# Patient Record
Sex: MALE | Race: WHITE | Employment: UNEMPLOYED | URBAN - NONMETROPOLITAN AREA
[De-identification: names, ages, dates, MRNs, and addresses within clinical notes are randomized per-mention and may not be internally consistent; named-entity substitution may affect disease eponyms.]

---

## 2019-07-25 ENCOUNTER — APPOINTMENT (OUTPATIENT)
Dept: GENERAL RADIOLOGY | Age: 39
End: 2019-07-25
Payer: COMMERCIAL

## 2019-07-25 ENCOUNTER — HOSPITAL ENCOUNTER (EMERGENCY)
Age: 39
Discharge: HOME | End: 2019-07-25
Payer: COMMERCIAL

## 2019-07-25 VITALS
RESPIRATION RATE: 22 BRPM | DIASTOLIC BLOOD PRESSURE: 88 MMHG | BODY MASS INDEX: 23.03 KG/M2 | SYSTOLIC BLOOD PRESSURE: 135 MMHG | OXYGEN SATURATION: 96 % | WEIGHT: 170 LBS | HEART RATE: 95 BPM | TEMPERATURE: 97.9 F | HEIGHT: 72 IN

## 2019-07-25 DIAGNOSIS — S46.911A STRAIN OF RIGHT SHOULDER, INITIAL ENCOUNTER: Primary | ICD-10-CM

## 2019-07-25 PROCEDURE — 73030 X-RAY EXAM OF SHOULDER: CPT

## 2019-07-25 PROCEDURE — 6360000002 HC RX W HCPCS: Performed by: NURSE PRACTITIONER

## 2019-07-25 PROCEDURE — 99283 EMERGENCY DEPT VISIT LOW MDM: CPT | Performed by: NURSE PRACTITIONER

## 2019-07-25 PROCEDURE — 99283 EMERGENCY DEPT VISIT LOW MDM: CPT

## 2019-07-25 PROCEDURE — 96372 THER/PROPH/DIAG INJ SC/IM: CPT

## 2019-07-25 RX ORDER — CYCLOBENZAPRINE HCL 10 MG
10 TABLET ORAL 3 TIMES DAILY PRN
Qty: 20 TABLET | Refills: 0 | Status: SHIPPED | OUTPATIENT
Start: 2019-07-25 | End: 2019-08-01

## 2019-07-25 RX ORDER — HYDROCODONE BITARTRATE AND ACETAMINOPHEN 5; 325 MG/1; MG/1
1 TABLET ORAL EVERY 6 HOURS PRN
Qty: 10 TABLET | Refills: 0 | Status: SHIPPED | OUTPATIENT
Start: 2019-07-25 | End: 2019-07-28

## 2019-07-25 RX ORDER — KETOROLAC TROMETHAMINE 30 MG/ML
30 INJECTION, SOLUTION INTRAMUSCULAR; INTRAVENOUS ONCE
Status: COMPLETED | OUTPATIENT
Start: 2019-07-25 | End: 2019-07-25

## 2019-07-25 RX ORDER — ORPHENADRINE CITRATE 30 MG/ML
60 INJECTION INTRAMUSCULAR; INTRAVENOUS ONCE
Status: COMPLETED | OUTPATIENT
Start: 2019-07-25 | End: 2019-07-25

## 2019-07-25 RX ORDER — NAPROXEN 500 MG/1
500 TABLET ORAL 2 TIMES DAILY
Qty: 30 TABLET | Refills: 0 | Status: SHIPPED | OUTPATIENT
Start: 2019-07-25 | End: 2020-02-15

## 2019-07-25 RX ADMIN — KETOROLAC TROMETHAMINE 30 MG: 30 INJECTION, SOLUTION INTRAMUSCULAR; INTRAVENOUS at 15:59

## 2019-07-25 RX ADMIN — ORPHENADRINE CITRATE 60 MG: 30 INJECTION INTRAMUSCULAR; INTRAVENOUS at 15:59

## 2019-07-25 ASSESSMENT — PAIN SCALES - GENERAL: PAINLEVEL_OUTOF10: 6

## 2019-07-25 NOTE — ED PROVIDER NOTES
Physician who either signs or Co-signs this chart in the absence of acardiologist.        RADIOLOGY:   Non-plain film images such as CT, Ultrasound andMRI are read by the radiologist. Plain radiographic images are visualized and preliminarily interpreted by the emergency physician with the below findings:        Interpretation per the Radiologist below, if available at the time of this note:    XR SHOULDER RIGHT (MIN 2 VIEWS)   Final Result   1. No acute bony abnormality is seen. Signed by Dr Louis Dukes on 7/25/2019 3:42 PM            ED BEDSIDE ULTRASOUND:   Performed by ED Physician - none    LABS:  Labs Reviewed - No data to display    All other labs were within normal range or not returned as of this dictation. RE-ASSESSMENT           EMERGENCY DEPARTMENT COURSE and DIFFERENTIALDIAGNOSIS/MDM:   Vitals:    Vitals:    07/25/19 1519   BP: 135/88   Pulse: 95   Resp: 22   Temp: 97.9 °F (36.6 °C)   TempSrc: Temporal   SpO2: 96%   Weight: 170 lb (77.1 kg)   Height: 6' (1.829 m)       MDM      CONSULTS:  None    PROCEDURES:  Unless otherwise notedbelow, none     Procedures    FINAL IMPRESSION     1. Strain of right shoulder, initial encounter          DISPOSITION/PLAN   DISPOSITION Decision To Discharge 07/25/2019 04:05:11 PM      PATIENT REFERRED TO:  Tedi Eisenmenger,  [address]  [place] [phone]    Schedule an appointment as soon as possible for a visit in 5 days  fail to improve      DISCHARGE MEDICATIONS:    Attestation: The Prescription Monitoring Report for this patient was reviewed today. ([id number]) GENE Palacios)  Periodic Controlled Substance Monitoring: No signs of potential drug abuse or diversion identified.  GENE Palacios)  Current Discharge Medication List           Medication List      START taking these medications    cyclobenzaprine 10 MG tablet  Commonly known as:  FLEXERIL  Take 1 tablet by mouth 3 times daily as needed for Muscle spasms

## 2020-02-15 ENCOUNTER — APPOINTMENT (OUTPATIENT)
Dept: CT IMAGING | Age: 40
End: 2020-02-15
Payer: MEDICAID

## 2020-02-15 ENCOUNTER — APPOINTMENT (OUTPATIENT)
Dept: GENERAL RADIOLOGY | Age: 40
End: 2020-02-15
Payer: MEDICAID

## 2020-02-15 ENCOUNTER — HOSPITAL ENCOUNTER (EMERGENCY)
Age: 40
Discharge: HOME | End: 2020-02-15
Attending: EMERGENCY MEDICINE
Payer: MEDICAID

## 2020-02-15 VITALS
SYSTOLIC BLOOD PRESSURE: 144 MMHG | RESPIRATION RATE: 20 BRPM | TEMPERATURE: 98.4 F | HEART RATE: 88 BPM | OXYGEN SATURATION: 96 % | DIASTOLIC BLOOD PRESSURE: 89 MMHG

## 2020-02-15 LAB
ALBUMIN SERPL-MCNC: 4.1 G/DL (ref 3.5–5.2)
ALP BLD-CCNC: 92 U/L (ref 40–130)
ALT SERPL-CCNC: 12 U/L (ref 5–41)
AMPHETAMINE SCREEN, URINE: POSITIVE
ANION GAP SERPL CALCULATED.3IONS-SCNC: 12 MMOL/L (ref 7–19)
AST SERPL-CCNC: 19 U/L (ref 5–40)
BARBITURATE SCREEN URINE: NEGATIVE
BASOPHILS ABSOLUTE: 0.1 K/UL (ref 0–0.2)
BASOPHILS RELATIVE PERCENT: 0.6 % (ref 0–1)
BENZODIAZEPINE SCREEN, URINE: NEGATIVE
BILIRUB SERPL-MCNC: <0.2 MG/DL (ref 0.2–1.2)
BILIRUBIN URINE: NEGATIVE
BLOOD, URINE: NEGATIVE
BUN BLDV-MCNC: 12 MG/DL (ref 6–20)
CALCIUM SERPL-MCNC: 9.1 MG/DL (ref 8.6–10)
CANNABINOID SCREEN URINE: NEGATIVE
CHLORIDE BLD-SCNC: 102 MMOL/L (ref 98–111)
CLARITY: CLEAR
CO2: 22 MMOL/L (ref 22–29)
COCAINE METABOLITE SCREEN URINE: NEGATIVE
COLOR: YELLOW
CREAT SERPL-MCNC: 1 MG/DL (ref 0.5–1.2)
EOSINOPHILS ABSOLUTE: 0.3 K/UL (ref 0–0.6)
EOSINOPHILS RELATIVE PERCENT: 3.1 % (ref 0–5)
ETHANOL: 118 MG/DL (ref 0–0.08)
GFR NON-AFRICAN AMERICAN: >60
GLUCOSE BLD-MCNC: 110 MG/DL (ref 74–109)
GLUCOSE URINE: NEGATIVE MG/DL
HCT VFR BLD CALC: 50.6 % (ref 42–52)
HEMOGLOBIN: 16.6 G/DL (ref 14–18)
IMMATURE GRANULOCYTES #: 0.1 K/UL
KETONES, URINE: NEGATIVE MG/DL
LEUKOCYTE ESTERASE, URINE: NEGATIVE
LYMPHOCYTES ABSOLUTE: 2 K/UL (ref 1.1–4.5)
LYMPHOCYTES RELATIVE PERCENT: 21.6 % (ref 20–40)
Lab: ABNORMAL
MCH RBC QN AUTO: 30.5 PG (ref 27–31)
MCHC RBC AUTO-ENTMCNC: 32.8 G/DL (ref 33–37)
MCV RBC AUTO: 93 FL (ref 80–94)
MONOCYTES ABSOLUTE: 0.8 K/UL (ref 0–0.9)
MONOCYTES RELATIVE PERCENT: 8 % (ref 0–10)
NEUTROPHILS ABSOLUTE: 6.2 K/UL (ref 1.5–7.5)
NEUTROPHILS RELATIVE PERCENT: 66.2 % (ref 50–65)
NITRITE, URINE: NEGATIVE
OPIATE SCREEN URINE: NEGATIVE
PDW BLD-RTO: 12.5 % (ref 11.5–14.5)
PH UA: 6 (ref 5–8)
PLATELET # BLD: 258 K/UL (ref 130–400)
PMV BLD AUTO: 10.1 FL (ref 9.4–12.4)
POTASSIUM REFLEX MAGNESIUM: 4 MMOL/L (ref 3.5–5)
PROTEIN UA: NEGATIVE MG/DL
RBC # BLD: 5.44 M/UL (ref 4.7–6.1)
SODIUM BLD-SCNC: 136 MMOL/L (ref 136–145)
SPECIFIC GRAVITY UA: 1 (ref 1–1.03)
TOTAL PROTEIN: 6.9 G/DL (ref 6.6–8.7)
URINE REFLEX TO CULTURE: NORMAL
UROBILINOGEN, URINE: 0.2 E.U./DL
WBC # BLD: 9.3 K/UL (ref 4.8–10.8)

## 2020-02-15 PROCEDURE — 90715 TDAP VACCINE 7 YRS/> IM: CPT | Performed by: EMERGENCY MEDICINE

## 2020-02-15 PROCEDURE — G0480 DRUG TEST DEF 1-7 CLASSES: HCPCS

## 2020-02-15 PROCEDURE — 93005 ELECTROCARDIOGRAM TRACING: CPT | Performed by: EMERGENCY MEDICINE

## 2020-02-15 PROCEDURE — 90471 IMMUNIZATION ADMIN: CPT | Performed by: EMERGENCY MEDICINE

## 2020-02-15 PROCEDURE — 99284 EMERGENCY DEPT VISIT MOD MDM: CPT

## 2020-02-15 PROCEDURE — 81003 URINALYSIS AUTO W/O SCOPE: CPT

## 2020-02-15 PROCEDURE — 12002 RPR S/N/AX/GEN/TRNK2.6-7.5CM: CPT

## 2020-02-15 PROCEDURE — 6360000002 HC RX W HCPCS: Performed by: EMERGENCY MEDICINE

## 2020-02-15 PROCEDURE — 70450 CT HEAD/BRAIN W/O DYE: CPT

## 2020-02-15 PROCEDURE — 80053 COMPREHEN METABOLIC PANEL: CPT

## 2020-02-15 PROCEDURE — 71045 X-RAY EXAM CHEST 1 VIEW: CPT

## 2020-02-15 PROCEDURE — 80307 DRUG TEST PRSMV CHEM ANLYZR: CPT

## 2020-02-15 PROCEDURE — 73560 X-RAY EXAM OF KNEE 1 OR 2: CPT

## 2020-02-15 PROCEDURE — 72125 CT NECK SPINE W/O DYE: CPT

## 2020-02-15 PROCEDURE — 2500000003 HC RX 250 WO HCPCS: Performed by: EMERGENCY MEDICINE

## 2020-02-15 PROCEDURE — 85025 COMPLETE CBC W/AUTO DIFF WBC: CPT

## 2020-02-15 PROCEDURE — 70250 X-RAY EXAM OF SKULL: CPT

## 2020-02-15 PROCEDURE — 36415 COLL VENOUS BLD VENIPUNCTURE: CPT

## 2020-02-15 RX ORDER — LIDOCAINE HYDROCHLORIDE 10 MG/ML
5 INJECTION, SOLUTION EPIDURAL; INFILTRATION; INTRACAUDAL; PERINEURAL ONCE
Status: COMPLETED | OUTPATIENT
Start: 2020-02-15 | End: 2020-02-15

## 2020-02-15 RX ORDER — QUETIAPINE FUMARATE 50 MG/1
50 TABLET, FILM COATED ORAL NIGHTLY
COMMUNITY
End: 2020-10-09

## 2020-02-15 RX ADMIN — LIDOCAINE HYDROCHLORIDE 5 ML: 10 INJECTION, SOLUTION EPIDURAL; INFILTRATION; INTRACAUDAL; PERINEURAL at 19:07

## 2020-02-15 RX ADMIN — TETANUS TOXOID, REDUCED DIPHTHERIA TOXOID AND ACELLULAR PERTUSSIS VACCINE, ADSORBED 0.5 ML: 5; 2.5; 8; 8; 2.5 SUSPENSION INTRAMUSCULAR at 19:08

## 2020-02-15 ASSESSMENT — ENCOUNTER SYMPTOMS
APNEA: 0
EYE DISCHARGE: 0
CONSTIPATION: 0
BLOOD IN STOOL: 0
SHORTNESS OF BREATH: 0
SINUS PRESSURE: 0
NAUSEA: 0
SORE THROAT: 0
VOICE CHANGE: 0
CHOKING: 0
DIARRHEA: 0
ABDOMINAL PAIN: 0
FACIAL SWELLING: 0

## 2020-02-15 ASSESSMENT — PAIN SCALES - GENERAL: PAINLEVEL_OUTOF10: 5
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
## 2020-02-18 LAB
EKG P AXIS: 64 DEGREES
EKG P-R INTERVAL: 162 MS
EKG Q-T INTERVAL: 312 MS
EKG QRS DURATION: 68 MS
EKG QTC CALCULATION (BAZETT): 389 MS
EKG T AXIS: 43 DEGREES

## 2020-02-18 PROCEDURE — 93010 ELECTROCARDIOGRAM REPORT: CPT | Performed by: INTERNAL MEDICINE

## 2020-10-09 ENCOUNTER — APPOINTMENT (OUTPATIENT)
Dept: GENERAL RADIOLOGY | Age: 40
End: 2020-10-09
Payer: MEDICAID

## 2020-10-09 ENCOUNTER — HOSPITAL ENCOUNTER (EMERGENCY)
Age: 40
Discharge: HOME | End: 2020-10-10
Attending: EMERGENCY MEDICINE
Payer: MEDICAID

## 2020-10-09 ENCOUNTER — APPOINTMENT (OUTPATIENT)
Dept: CT IMAGING | Age: 40
End: 2020-10-09
Payer: MEDICAID

## 2020-10-09 LAB
ALBUMIN SERPL-MCNC: 4.3 G/DL (ref 3.5–5.2)
ALP BLD-CCNC: 78 U/L (ref 40–130)
ALT SERPL-CCNC: 19 U/L (ref 5–41)
ANION GAP SERPL CALCULATED.3IONS-SCNC: 12 MMOL/L (ref 7–19)
AST SERPL-CCNC: 23 U/L (ref 5–40)
BASOPHILS ABSOLUTE: 0.1 K/UL (ref 0–0.2)
BASOPHILS RELATIVE PERCENT: 0.8 % (ref 0–1)
BILIRUB SERPL-MCNC: <0.2 MG/DL (ref 0.2–1.2)
BUN BLDV-MCNC: 16 MG/DL (ref 6–20)
CALCIUM SERPL-MCNC: 9.1 MG/DL (ref 8.6–10)
CHLORIDE BLD-SCNC: 105 MMOL/L (ref 98–111)
CO2: 22 MMOL/L (ref 22–29)
CREAT SERPL-MCNC: 1.3 MG/DL (ref 0.5–1.2)
EOSINOPHILS ABSOLUTE: 0.4 K/UL (ref 0–0.6)
EOSINOPHILS RELATIVE PERCENT: 4.7 % (ref 0–5)
ETHANOL: 164 MG/DL (ref 0–0.08)
GFR AFRICAN AMERICAN: >59
GFR NON-AFRICAN AMERICAN: >60
GLUCOSE BLD-MCNC: 103 MG/DL (ref 74–109)
GLUCOSE BLD-MCNC: 99 MG/DL
GLUCOSE BLD-MCNC: 99 MG/DL (ref 70–99)
HCT VFR BLD CALC: 45.4 % (ref 42–52)
HEMOGLOBIN: 15.4 G/DL (ref 14–18)
IMMATURE GRANULOCYTES #: 0 K/UL
LYMPHOCYTES ABSOLUTE: 2.8 K/UL (ref 1.1–4.5)
LYMPHOCYTES RELATIVE PERCENT: 32.6 % (ref 20–40)
MCH RBC QN AUTO: 31.6 PG (ref 27–31)
MCHC RBC AUTO-ENTMCNC: 33.9 G/DL (ref 33–37)
MCV RBC AUTO: 93.2 FL (ref 80–94)
MONOCYTES ABSOLUTE: 0.7 K/UL (ref 0–0.9)
MONOCYTES RELATIVE PERCENT: 8 % (ref 0–10)
NEUTROPHILS ABSOLUTE: 4.6 K/UL (ref 1.5–7.5)
NEUTROPHILS RELATIVE PERCENT: 53.4 % (ref 50–65)
PDW BLD-RTO: 13 % (ref 11.5–14.5)
PERFORMED ON: NORMAL
PLATELET # BLD: 248 K/UL (ref 130–400)
PMV BLD AUTO: 10.8 FL (ref 9.4–12.4)
POTASSIUM SERPL-SCNC: 4.2 MMOL/L (ref 3.5–5)
RBC # BLD: 4.87 M/UL (ref 4.7–6.1)
SODIUM BLD-SCNC: 139 MMOL/L (ref 136–145)
TOTAL PROTEIN: 7 G/DL (ref 6.6–8.7)
TROPONIN: <0.01 NG/ML (ref 0–0.03)
WBC # BLD: 8.5 K/UL (ref 4.8–10.8)

## 2020-10-09 PROCEDURE — 84484 ASSAY OF TROPONIN QUANT: CPT

## 2020-10-09 PROCEDURE — 80053 COMPREHEN METABOLIC PANEL: CPT

## 2020-10-09 PROCEDURE — G0480 DRUG TEST DEF 1-7 CLASSES: HCPCS

## 2020-10-09 PROCEDURE — 99999 PR OFFICE/OUTPT VISIT,PROCEDURE ONLY: CPT | Performed by: EMERGENCY MEDICINE

## 2020-10-09 PROCEDURE — 99284 EMERGENCY DEPT VISIT MOD MDM: CPT

## 2020-10-09 PROCEDURE — 82947 ASSAY GLUCOSE BLOOD QUANT: CPT

## 2020-10-09 PROCEDURE — 70450 CT HEAD/BRAIN W/O DYE: CPT

## 2020-10-09 PROCEDURE — 71045 X-RAY EXAM CHEST 1 VIEW: CPT

## 2020-10-09 PROCEDURE — 85025 COMPLETE CBC W/AUTO DIFF WBC: CPT

## 2020-10-09 PROCEDURE — 36415 COLL VENOUS BLD VENIPUNCTURE: CPT

## 2020-10-09 RX ORDER — 0.9 % SODIUM CHLORIDE 0.9 %
1000 INTRAVENOUS SOLUTION INTRAVENOUS ONCE
Status: COMPLETED | OUTPATIENT
Start: 2020-10-09 | End: 2020-10-10

## 2020-10-09 RX ORDER — LAMOTRIGINE 100 MG/1
100 TABLET ORAL DAILY
COMMUNITY
End: 2022-03-14

## 2020-10-10 VITALS
DIASTOLIC BLOOD PRESSURE: 76 MMHG | OXYGEN SATURATION: 98 % | SYSTOLIC BLOOD PRESSURE: 132 MMHG | TEMPERATURE: 98 F | WEIGHT: 220 LBS | RESPIRATION RATE: 18 BRPM | HEIGHT: 71 IN | HEART RATE: 78 BPM | BODY MASS INDEX: 30.8 KG/M2

## 2020-10-10 LAB
AMPHETAMINE SCREEN, URINE: POSITIVE
BARBITURATE SCREEN URINE: NEGATIVE
BENZODIAZEPINE SCREEN, URINE: NEGATIVE
CANNABINOID SCREEN URINE: POSITIVE
COCAINE METABOLITE SCREEN URINE: NEGATIVE
Lab: ABNORMAL
OPIATE SCREEN URINE: NEGATIVE

## 2020-10-10 PROCEDURE — 2580000003 HC RX 258: Performed by: EMERGENCY MEDICINE

## 2020-10-10 PROCEDURE — 80307 DRUG TEST PRSMV CHEM ANLYZR: CPT

## 2020-10-10 RX ADMIN — SODIUM CHLORIDE 1000 ML: 9 INJECTION, SOLUTION INTRAVENOUS at 00:54

## 2020-10-10 ASSESSMENT — ENCOUNTER SYMPTOMS
SORE THROAT: 0
VOMITING: 0
BACK PAIN: 0
DIARRHEA: 0
NAUSEA: 0
RHINORRHEA: 0
SHORTNESS OF BREATH: 0
COUGH: 0
ABDOMINAL PAIN: 0

## 2020-10-10 NOTE — ED PROVIDER NOTES
reviewed and are negative. PAST MEDICALHISTORY     Past Medical History:   Diagnosis Date    ADHD     Bipolar 1 disorder ([facility] [address]          SURGICAL HISTORY     History reviewed. No pertinent surgical history. CURRENT MEDICATIONS     Discharge Medication List as of 10/10/2020 12:24 AM      CONTINUE these medications which have NOT CHANGED    Details   lamoTRIgine (LAMICTAL) 100 MG tablet Take 100 mg by mouth dailyHistorical Med             ALLERGIES     Adhesive tape    FAMILY HISTORY     History reviewed. No pertinent family history.        SOCIAL HISTORY       Social History     Socioeconomic History    Marital status:      Spouse name: None    Number of children: None    Years of education: None    Highest education level: None   Occupational History    None   Social Needs    Financial resource strain: None    Food insecurity     Worry: None     Inability: None    Transportation needs     Medical: None     Non-medical: None   Tobacco Use    Smoking status: Current Every Day Smoker     Packs/day: 0.50     Types: Cigarettes    Smokeless tobacco: Never Used   Substance and Sexual Activity    Alcohol use: Yes     Comment: occ    Drug use: Not Currently    Sexual activity: None   Lifestyle    Physical activity     Days per week: None     Minutes per session: None    Stress: None   Relationships    Social connections     Talks on phone: None     Gets together: None     Attends Bahai service: None     Active member of club or organization: None     Attends meetings of clubs or organizations: None     Relationship status: None    Intimate partner violence     Fear of current or ex partner: None     Emotionally abused: None     Physically abused: None     Forced sexual activity: None   Other Topics Concern    None   Social History Narrative    None       SCREENINGS    Rafael Coma Scale  Eye Opening: Spontaneous  Best Verbal Response: Oriented  Best Motor Response: Obeys commands  [place] Coma Scale Score: 15        PHYSICAL EXAM    (up to 7 for level 4, 8 or more for level 5)     ED Triage Vitals   BP Temp Temp src Pulse Resp SpO2 Height Weight   10/09/20 2233 10/09/20 2231 -- 10/09/20 2233 -- 10/09/20 2233 10/09/20 2231 --   (!) 162/100 98 °F (36.7 °C)  89  95 % 6' (1.829 m)        Physical Exam  Vitals signs and nursing note reviewed. Constitutional:       Appearance: Normal appearance. He is well-developed. He is not ill-appearing, toxic-appearing or diaphoretic. Comments: anxious   HENT:      Head: Normocephalic and atraumatic. Nose: Nose normal.      Mouth/Throat:      Mouth: Mucous membranes are moist.   Eyes:      Extraocular Movements: Extraocular movements intact. Conjunctiva/sclera: Conjunctivae normal.      Pupils: Pupils are equal, round, and reactive to light. Neck:      Musculoskeletal: Normal range of motion and neck supple. Trachea: No tracheal deviation. Cardiovascular:      Rate and Rhythm: Normal rate and regular rhythm. Heart sounds: Normal heart sounds. No murmur. Pulmonary:      Breath sounds: Normal breath sounds. No wheezing or rales. Abdominal:      Palpations: Abdomen is soft. There is no mass. Tenderness: There is no abdominal tenderness. Musculoskeletal: Normal range of motion. Right lower leg: No edema. Left lower leg: No edema. Skin:     General: Skin is warm and dry. Neurological:      Mental Status: He is alert and oriented to person, place, and time. GCS: GCS eye subscore is 4. GCS verbal subscore is 5. GCS motor subscore is 6. Cranial Nerves: No dysarthria or facial asymmetry. Motor: Motor function is intact. No abnormal muscle tone. Gait: Gait is intact.          DIAGNOSTIC RESULTS     EKG: All EKG's areinterpreted by the Emergency Department Physician who either signs or Co-signs this chart in the absence of a cardiologist.    86 normal sinus rhythm, no obvious ST changes nondiagnostic EKG    RADIOLOGY:  Non-plain film images such as CT, Ultrasound and MRI are read by the radiologist. Plain radiographic images are visualized and preliminarily interpreted bythe emergency physician with the below findings:        XR CHEST PORTABLE    (Results Pending)   CT HEAD WO CONTRAST    (Results Pending)           LABS:  Labs Reviewed   CBC WITH AUTO DIFFERENTIAL - Abnormal; Notable for the following components:       Result Value    MCH 31.6 (*)     All other components within normal limits   COMPREHENSIVE METABOLIC PANEL - Abnormal; Notable for the following components:    CREATININE 1.3 (*)     All other components within normal limits   URINE DRUG SCREEN - Abnormal; Notable for the following components:    Amphetamine Screen, Urine Positive (*)     Cannabinoid Scrn, Ur Positive (*)     All other components within normal limits   POCT GLUCOSE - Normal   TROPONIN   ETHANOL   POCT GLUCOSE       All other labs were within normal range or not returned as of this dictation. EMERGENCY DEPARTMENT COURSE and DIFFERENTIAL DIAGNOSIS/MDM:   Vitals:    Vitals:    10/10/20 0016 10/10/20 0023 10/10/20 0028 10/10/20 0122   BP:  (!) 158/87 127/73 132/76   Pulse: 84  85 78   Resp: 19  18 18   Temp:  98.6 °F (37 °C)  98 °F (36.7 °C)   TempSrc:  Oral     SpO2: 92%  95% 98%   Weight:       Height:           MDM  Number of Diagnoses or Management Options  Acute alcoholic intoxication without complication ([facility] [address]):    Anxiety state:   Atypical chest pain:   Polysubstance abuse [facility]):      Amount and/or Complexity of Data Reviewed  Clinical lab tests: ordered and reviewed  Tests in the radiology section of CPT®: ordered and reviewed  Independent visualization of images, tracings, or specimens: yes      VSS, syncopal event at home based on hx, he is overall well appearing here but quite anxious, etoh, marijuana, amphetamine positive but denies meth, pt feeling better now, very low suspicion of cardiac, trop neg, no concern for PE, not seizure I do not believe either, ct head neg, pt DC in stable condition with his wife, understand return precautions      CONSULTS:  None    PROCEDURES:  Unless otherwise noted below, none     Procedures    FINAL IMPRESSION      1. Atypical chest pain    2. Anxiety state    3. Acute alcoholic intoxication without complication ([facility] [address]    4.  Polysubstance abuse [facility])          DISPOSITION/PLAN   DISPOSITION Decision To Discharge 10/10/2020 12:33:07 AM      PATIENT REFERRED TO:  Jayesh Grace, [address] [facility] [address] [facility] [phone]    Schedule an appointment as soon as possible for a visit in 1 week        DISCHARGE MEDICATIONS:  Discharge Medication List as of 10/10/2020 12:24 AM             (Please note that portions of this note were completed with a voice recognition program.  Efforts were made to edit thedictations but occasionally words are mis-transcribed.)    Christelle Henriquez MD (electronically signed)  Attending Emergency Physician        Michelle Wheat MD  10/10/20 6291

## 2020-10-10 NOTE — ED NOTES
Pt states that he drank 1/2 of a fifth today. Denies any drug use other than cigarettes and marijuana.        Malina Dee RN  10/09/20 3494

## 2020-10-10 NOTE — ED NOTES
Bed: 05  Expected date:   Expected time:   Means of arrival:   Comments:  Rj Zambrano RN  10/09/20 6749

## 2020-10-13 LAB
EKG P AXIS: 76 DEGREES
EKG P-R INTERVAL: 176 MS
EKG Q-T INTERVAL: 342 MS
EKG QRS DURATION: 84 MS
EKG QTC CALCULATION (BAZETT): 387 MS
EKG T AXIS: 53 DEGREES

## 2020-11-20 ENCOUNTER — TRANSCRIBE ORDERS (OUTPATIENT)
Dept: ADMINISTRATIVE | Facility: HOSPITAL | Age: 40
End: 2020-11-20

## 2020-11-20 ENCOUNTER — LAB (OUTPATIENT)
Dept: LAB | Facility: HOSPITAL | Age: 40
End: 2020-11-20

## 2020-11-20 DIAGNOSIS — Z51.81 ENCOUNTER FOR THERAPEUTIC DRUG LEVEL MONITORING: Primary | ICD-10-CM

## 2020-11-20 LAB
AMPHET+METHAMPHET UR QL: NEGATIVE
AMPHETAMINES UR QL: NEGATIVE
BARBITURATES UR QL SCN: NEGATIVE
BENZODIAZ UR QL SCN: NEGATIVE
BUPRENORPHINE SERPL-MCNC: NEGATIVE NG/ML
CANNABINOIDS SERPL QL: NEGATIVE
COCAINE UR QL: NEGATIVE
METHADONE UR QL SCN: NEGATIVE
OPIATES UR QL: NEGATIVE
OXYCODONE UR QL SCN: NEGATIVE
PCP UR QL SCN: NEGATIVE
PROPOXYPH UR QL: NEGATIVE
TRICYCLICS UR QL SCN: NEGATIVE

## 2020-11-20 PROCEDURE — 80306 DRUG TEST PRSMV INSTRMNT: CPT | Performed by: NURSE PRACTITIONER

## 2021-04-20 ENCOUNTER — OFFICE VISIT (OUTPATIENT)
Dept: FAMILY MEDICINE CLINIC | Facility: CLINIC | Age: 41
End: 2021-04-20

## 2021-04-20 VITALS
SYSTOLIC BLOOD PRESSURE: 170 MMHG | HEART RATE: 103 BPM | HEIGHT: 71 IN | OXYGEN SATURATION: 98 % | WEIGHT: 210 LBS | BODY MASS INDEX: 29.4 KG/M2 | TEMPERATURE: 97.6 F | DIASTOLIC BLOOD PRESSURE: 92 MMHG

## 2021-04-20 DIAGNOSIS — F90.2 ATTENTION DEFICIT HYPERACTIVITY DISORDER (ADHD), COMBINED TYPE: Primary | ICD-10-CM

## 2021-04-20 DIAGNOSIS — F39 MOOD DISORDER (HCC): ICD-10-CM

## 2021-04-20 DIAGNOSIS — I10 ESSENTIAL HYPERTENSION: ICD-10-CM

## 2021-04-20 PROCEDURE — 99204 OFFICE O/P NEW MOD 45 MIN: CPT | Performed by: NURSE PRACTITIONER

## 2021-04-20 RX ORDER — LAMOTRIGINE 25 MG/1
25 TABLET ORAL 3 TIMES DAILY
COMMUNITY
Start: 2021-04-19 | End: 2021-04-20

## 2021-04-20 RX ORDER — LAMOTRIGINE 100 MG/1
100 TABLET ORAL DAILY
Qty: 30 TABLET | Refills: 1 | Status: SHIPPED | OUTPATIENT
Start: 2021-04-20

## 2021-04-20 RX ORDER — LOSARTAN POTASSIUM 50 MG/1
50 TABLET ORAL DAILY
Qty: 30 TABLET | Refills: 1 | Status: SHIPPED | OUTPATIENT
Start: 2021-04-20 | End: 2022-12-09

## 2021-04-20 NOTE — PROGRESS NOTES
"Chief Complaint  Mood Disorder (3 month f/u medication management) and ADHD    Subjective    History of Present Illness      Patient presents to [facility] for   ADHD/Mood HPI  Patient states his insurance is not covering any of the previous ADHD meds that we have prescribed for him and he would like to get whichever one would be the cheapest for self pay.  He says he has to do something.  The lamictal helps sometimes but he says he seems more agitated than not.  Says he has a lot going on right now.    Visit for:  follow-up. Most recent visit was 3 months ago.  Interim changes to follow up on today: possibly initiate ADHD med and change in mood meds  Work/School Performance:  struggling  Cognitive:  unable to focus    Behavior  Hyperactivity: hyperactive  Impulsivity: impulsive  Tasking: difficulty initiating tasks and difficulty completing tasks    Social  ADHD social/impulsive symptoms:  has difficulty awaiting turn    Behavioral health  Behavior: demonstrates aggressive behavior, anxious behavior and no concerns  Emotional coping: demonstrates feelings of anger and anxiety         Review of Systems   Constitutional: Negative.    HENT: Negative.    Eyes: Negative.    Respiratory: Negative.    Cardiovascular: Negative.    Gastrointestinal: Negative.    Endocrine: Negative.    Genitourinary: Negative.    Musculoskeletal: Negative.    Skin: Negative.    Allergic/Immunologic: Negative.    Neurological: Negative.    Hematological: Negative.    Psychiatric/Behavioral: Positive for agitation and decreased concentration. The patient is nervous/anxious.        I have reviewed and agree with the HPI and ROS information as above.  Clau Mas, APRN     Objective   Vital Signs:   /92   Pulse 103   Temp 97.6 °F (36.4 °C)   Ht 180.3 cm (71\")   Wt 95.3 kg (210 lb)   SpO2 98%   BMI 29.29 kg/m²       Physical Exam  Constitutional:       Appearance: Normal appearance. He is " well-developed.   HENT:      Head: Normocephalic and atraumatic.      Right Ear: External ear normal.      Left Ear: External ear normal.      Nose: Nose normal. No nasal tenderness or congestion.      Mouth/Throat:      Lips: Pink. No lesions.      Mouth: Mucous membranes are moist. No oral lesions.      Dentition: Normal dentition.      Pharynx: Oropharynx is clear. No pharyngeal swelling, oropharyngeal exudate or posterior oropharyngeal erythema.   Eyes:      General: Lids are normal. Vision grossly intact. No scleral icterus.        Right eye: No discharge.         Left eye: No discharge.      Extraocular Movements: Extraocular movements intact.      Conjunctiva/sclera: Conjunctivae normal.      Right eye: Right conjunctiva is not injected.      Left eye: Left conjunctiva is not injected.      Pupils: Pupils are equal, round, and reactive to light.   Cardiovascular:      Rate and Rhythm: Normal rate and regular rhythm.      Heart sounds: Normal heart sounds. No murmur heard.   No gallop.    Pulmonary:      Effort: Pulmonary effort is normal.      Breath sounds: Normal breath sounds and air entry. No wheezing, rhonchi or rales.   Musculoskeletal:         General: No tenderness or deformity. Normal range of motion.      Cervical back: Full passive range of motion without pain, normal range of motion and neck supple.      Right lower leg: No edema.      Left lower leg: No edema.   Skin:     General: Skin is warm and dry.      Coloration: Skin is not jaundiced.      Findings: No rash.   Neurological:      Mental Status: He is alert and oriented to person, place, and time.      Cranial Nerves: Cranial nerves are intact.      Sensory: Sensation is intact.      Motor: Motor function is intact.      Coordination: Coordination is intact.      Gait: Gait is intact.   Psychiatric:         Attention and Perception: Attention normal.         Mood and Affect: Mood and affect normal.         Behavior: Behavior is not  hyperactive. Behavior is cooperative.         Thought Content: Thought content normal.         Judgment: Judgment normal.          Result Review  Data Reviewed:                   Assessment and Plan        Problem List Items Addressed This Visit        Cardiac and Vasculature    Essential hypertension    Relevant Medications    losartan (Cozaar) 50 MG tablet       Mental Health    Attention deficit hyperactivity disorder (ADHD), combined type - Primary    Relevant Orders    Urine Drug Screen - Urine, Clean Catch    Mood disorder (CMS/HCC)    Relevant Medications    lamoTRIgine (LaMICtal) 100 MG tablet      Patient comes in today to follow-up on mood disorder and ADHD.  Overall he states his mood is stable.  He has recently gone through a divorce and feels like he handled it well.  He does state that there is probably room for improvement.  We will increase dose of Lamictal 200 mg daily.    Patient insurance would not cover Vyvanse, Adderall, or Concerta.  Patient started the process of seeing if [facility] would be available but did not complete it.  Sounds like he might have needed a denial letter.  We will give him a copy of the denial letter from December and new paperwork for this.    Patient blood pressure is elevated today.  He has not been on his blood pressure medicine that we previously prescribed to him.  Explained to him that he has to get started back on this in order to be on the ADHD medicine.  He voiced understanding.    We will get up-to-date drug screen today.  As long as that is clean can proceed with Vyvanse.  Marcelo is clean.        Follow Up   Return in about 4 weeks (around 5/18/2021).  Patient was given instructions and counseling regarding his condition or for health maintenance advice. Please see specific information pulled into the AVS if appropriate.

## 2021-06-29 ENCOUNTER — HOSPITAL ENCOUNTER (EMERGENCY)
Facility: HOSPITAL | Age: 41
Discharge: HOME | End: 2021-06-29
Attending: EMERGENCY MEDICINE | Admitting: EMERGENCY MEDICINE

## 2021-06-29 ENCOUNTER — APPOINTMENT (OUTPATIENT)
Dept: ULTRASOUND IMAGING | Facility: HOSPITAL | Age: 41
End: 2021-06-29

## 2021-06-29 VITALS
HEART RATE: 77 BPM | SYSTOLIC BLOOD PRESSURE: 150 MMHG | RESPIRATION RATE: 18 BRPM | TEMPERATURE: 98.1 F | BODY MASS INDEX: 30.24 KG/M2 | WEIGHT: 216 LBS | OXYGEN SATURATION: 100 % | DIASTOLIC BLOOD PRESSURE: 87 MMHG | HEIGHT: 71 IN

## 2021-06-29 DIAGNOSIS — N43.3 HYDROCELE IN ADULT: ICD-10-CM

## 2021-06-29 DIAGNOSIS — N50.812 PAIN IN LEFT TESTICLE: Primary | ICD-10-CM

## 2021-06-29 LAB
ANION GAP SERPL CALCULATED.3IONS-SCNC: 7 MMOL/L (ref 5–15)
BASOPHILS # BLD AUTO: 0.06 10*3/MM3 (ref 0–0.2)
BASOPHILS NFR BLD AUTO: 0.7 % (ref 0–1.5)
BILIRUB UR QL STRIP: NEGATIVE
BUN SERPL-MCNC: 11 MG/DL (ref 6–20)
BUN/CREAT SERPL: 12.8 (ref 7–25)
CALCIUM SPEC-SCNC: 9.2 MG/DL (ref 8.6–10.5)
CHLORIDE SERPL-SCNC: 106 MMOL/L (ref 98–107)
CLARITY UR: CLEAR
CO2 SERPL-SCNC: 28 MMOL/L (ref 22–29)
COLOR UR: YELLOW
CREAT SERPL-MCNC: 0.86 MG/DL (ref 0.76–1.27)
DEPRECATED RDW RBC AUTO: 41.9 FL (ref 37–54)
EOSINOPHIL # BLD AUTO: 0.31 10*3/MM3 (ref 0–0.4)
EOSINOPHIL NFR BLD AUTO: 3.8 % (ref 0.3–6.2)
ERYTHROCYTE [DISTWIDTH] IN BLOOD BY AUTOMATED COUNT: 12.6 % (ref 12.3–15.4)
GFR SERPL CREATININE-BSD FRML MDRD: 98 ML/MIN/1.73
GLUCOSE SERPL-MCNC: 115 MG/DL (ref 65–99)
GLUCOSE UR STRIP-MCNC: NEGATIVE MG/DL
HCT VFR BLD AUTO: 48.4 % (ref 37.5–51)
HGB BLD-MCNC: 16.1 G/DL (ref 13–17.7)
HGB UR QL STRIP.AUTO: NEGATIVE
IMM GRANULOCYTES # BLD AUTO: 0.03 10*3/MM3 (ref 0–0.05)
IMM GRANULOCYTES NFR BLD AUTO: 0.4 % (ref 0–0.5)
KETONES UR QL STRIP: NEGATIVE
LEUKOCYTE ESTERASE UR QL STRIP.AUTO: NEGATIVE
LYMPHOCYTES # BLD AUTO: 1.69 10*3/MM3 (ref 0.7–3.1)
LYMPHOCYTES NFR BLD AUTO: 20.9 % (ref 19.6–45.3)
MCH RBC QN AUTO: 30.4 PG (ref 26.6–33)
MCHC RBC AUTO-ENTMCNC: 33.3 G/DL (ref 31.5–35.7)
MCV RBC AUTO: 91.5 FL (ref 79–97)
MONOCYTES # BLD AUTO: 0.72 10*3/MM3 (ref 0.1–0.9)
MONOCYTES NFR BLD AUTO: 8.9 % (ref 5–12)
NEUTROPHILS NFR BLD AUTO: 5.26 10*3/MM3 (ref 1.7–7)
NEUTROPHILS NFR BLD AUTO: 65.3 % (ref 42.7–76)
NITRITE UR QL STRIP: NEGATIVE
NRBC BLD AUTO-RTO: 0 /100 WBC (ref 0–0.2)
PH UR STRIP.AUTO: 5.5 [PH] (ref 5–8)
PLATELET # BLD AUTO: 243 10*3/MM3 (ref 140–450)
PMV BLD AUTO: 10.2 FL (ref 6–12)
POTASSIUM SERPL-SCNC: 4.1 MMOL/L (ref 3.5–5.2)
PROT UR QL STRIP: NEGATIVE
RBC # BLD AUTO: 5.29 10*6/MM3 (ref 4.14–5.8)
SODIUM SERPL-SCNC: 141 MMOL/L (ref 136–145)
SP GR UR STRIP: 1.02 (ref 1–1.03)
UROBILINOGEN UR QL STRIP: NORMAL
WBC # BLD AUTO: 8.07 10*3/MM3 (ref 3.4–10.8)

## 2021-06-29 PROCEDURE — 99283 EMERGENCY DEPT VISIT LOW MDM: CPT

## 2021-06-29 PROCEDURE — 76870 US EXAM SCROTUM: CPT

## 2021-06-29 PROCEDURE — 81003 URINALYSIS AUTO W/O SCOPE: CPT | Performed by: EMERGENCY MEDICINE

## 2021-06-29 PROCEDURE — 85025 COMPLETE CBC W/AUTO DIFF WBC: CPT | Performed by: EMERGENCY MEDICINE

## 2021-06-29 PROCEDURE — 80048 BASIC METABOLIC PNL TOTAL CA: CPT | Performed by: EMERGENCY MEDICINE

## 2021-06-29 RX ORDER — SODIUM CHLORIDE 0.9 % (FLUSH) 0.9 %
10 SYRINGE (ML) INJECTION AS NEEDED
Status: DISCONTINUED | OUTPATIENT
Start: 2021-06-29 | End: 2021-06-29 | Stop reason: HOSPADM

## 2021-06-29 RX ORDER — NAPROXEN 500 MG/1
500 TABLET ORAL 2 TIMES DAILY PRN
Qty: 20 TABLET | Refills: 0 | OUTPATIENT
Start: 2021-06-29 | End: 2022-08-18

## 2021-06-29 RX ORDER — DOXYCYCLINE 100 MG/1
100 CAPSULE ORAL 2 TIMES DAILY
Qty: 20 CAPSULE | Refills: 0 | OUTPATIENT
Start: 2021-06-29 | End: 2021-09-20

## 2021-09-19 ENCOUNTER — HOSPITAL ENCOUNTER (EMERGENCY)
Facility: HOSPITAL | Age: 41
Discharge: HOME | End: 2021-09-20
Attending: STUDENT IN AN ORGANIZED HEALTH CARE EDUCATION/TRAINING PROGRAM | Admitting: STUDENT IN AN ORGANIZED HEALTH CARE EDUCATION/TRAINING PROGRAM

## 2021-09-19 ENCOUNTER — APPOINTMENT (OUTPATIENT)
Dept: CT IMAGING | Facility: HOSPITAL | Age: 41
End: 2021-09-19

## 2021-09-19 DIAGNOSIS — R30.1 PAINFUL BLADDER SPASM: Primary | ICD-10-CM

## 2021-09-19 LAB
ALBUMIN SERPL-MCNC: 4.4 G/DL (ref 3.5–5.2)
ALBUMIN/GLOB SERPL: 1.5 G/DL
ALP SERPL-CCNC: 90 U/L (ref 39–117)
ALT SERPL W P-5'-P-CCNC: 13 U/L (ref 1–41)
ANION GAP SERPL CALCULATED.3IONS-SCNC: 10 MMOL/L (ref 5–15)
APTT PPP: 28.8 SECONDS (ref 24.1–35)
AST SERPL-CCNC: 18 U/L (ref 1–40)
BACTERIA UR QL AUTO: ABNORMAL /HPF
BASOPHILS # BLD AUTO: 0.03 10*3/MM3 (ref 0–0.2)
BASOPHILS NFR BLD AUTO: 0.2 % (ref 0–1.5)
BILIRUB SERPL-MCNC: 0.3 MG/DL (ref 0–1.2)
BILIRUB UR QL STRIP: NEGATIVE
BUN SERPL-MCNC: 12 MG/DL (ref 6–20)
BUN/CREAT SERPL: 10.3 (ref 7–25)
CALCIUM SPEC-SCNC: 9.5 MG/DL (ref 8.6–10.5)
CHLORIDE SERPL-SCNC: 105 MMOL/L (ref 98–107)
CLARITY UR: CLEAR
CO2 SERPL-SCNC: 24 MMOL/L (ref 22–29)
COLOR UR: ABNORMAL
CREAT SERPL-MCNC: 1.17 MG/DL (ref 0.76–1.27)
D-LACTATE SERPL-SCNC: 1.2 MMOL/L (ref 0.5–2)
DEPRECATED RDW RBC AUTO: 43.5 FL (ref 37–54)
EOSINOPHIL # BLD AUTO: 0.01 10*3/MM3 (ref 0–0.4)
EOSINOPHIL NFR BLD AUTO: 0.1 % (ref 0.3–6.2)
ERYTHROCYTE [DISTWIDTH] IN BLOOD BY AUTOMATED COUNT: 12.6 % (ref 12.3–15.4)
GFR SERPL CREATININE-BSD FRML MDRD: 68 ML/MIN/1.73
GLOBULIN UR ELPH-MCNC: 3 GM/DL
GLUCOSE SERPL-MCNC: 120 MG/DL (ref 65–99)
GLUCOSE UR STRIP-MCNC: NEGATIVE MG/DL
HCT VFR BLD AUTO: 49.2 % (ref 37.5–51)
HGB BLD-MCNC: 16.4 G/DL (ref 13–17.7)
HGB UR QL STRIP.AUTO: NEGATIVE
HYALINE CASTS UR QL AUTO: ABNORMAL /LPF
IMM GRANULOCYTES # BLD AUTO: 0.1 10*3/MM3 (ref 0–0.05)
IMM GRANULOCYTES NFR BLD AUTO: 0.6 % (ref 0–0.5)
INR PPP: 0.97 (ref 0.91–1.09)
KETONES UR QL STRIP: ABNORMAL
LEUKOCYTE ESTERASE UR QL STRIP.AUTO: ABNORMAL
LYMPHOCYTES # BLD AUTO: 1.14 10*3/MM3 (ref 0.7–3.1)
LYMPHOCYTES NFR BLD AUTO: 6.6 % (ref 19.6–45.3)
MCH RBC QN AUTO: 31.1 PG (ref 26.6–33)
MCHC RBC AUTO-ENTMCNC: 33.3 G/DL (ref 31.5–35.7)
MCV RBC AUTO: 93.2 FL (ref 79–97)
MONOCYTES # BLD AUTO: 0.71 10*3/MM3 (ref 0.1–0.9)
MONOCYTES NFR BLD AUTO: 4.1 % (ref 5–12)
NEUTROPHILS NFR BLD AUTO: 15.21 10*3/MM3 (ref 1.7–7)
NEUTROPHILS NFR BLD AUTO: 88.4 % (ref 42.7–76)
NITRITE UR QL STRIP: NEGATIVE
NRBC BLD AUTO-RTO: 0 /100 WBC (ref 0–0.2)
PH UR STRIP.AUTO: 6 [PH] (ref 5–8)
PLATELET # BLD AUTO: 280 10*3/MM3 (ref 140–450)
PMV BLD AUTO: 10.1 FL (ref 6–12)
POTASSIUM SERPL-SCNC: 4.5 MMOL/L (ref 3.5–5.2)
PROT SERPL-MCNC: 7.4 G/DL (ref 6–8.5)
PROT UR QL STRIP: NEGATIVE
PROTHROMBIN TIME: 12.5 SECONDS (ref 11.9–14.6)
RBC # BLD AUTO: 5.28 10*6/MM3 (ref 4.14–5.8)
RBC # UR: ABNORMAL /HPF
REF LAB TEST METHOD: ABNORMAL
SODIUM SERPL-SCNC: 139 MMOL/L (ref 136–145)
SP GR UR STRIP: 1.02 (ref 1–1.03)
SQUAMOUS #/AREA URNS HPF: ABNORMAL /HPF
UROBILINOGEN UR QL STRIP: ABNORMAL
WBC # BLD AUTO: 17.2 10*3/MM3 (ref 3.4–10.8)
WBC UR QL AUTO: ABNORMAL /HPF

## 2021-09-19 PROCEDURE — 80053 COMPREHEN METABOLIC PANEL: CPT | Performed by: STUDENT IN AN ORGANIZED HEALTH CARE EDUCATION/TRAINING PROGRAM

## 2021-09-19 PROCEDURE — 85025 COMPLETE CBC W/AUTO DIFF WBC: CPT | Performed by: STUDENT IN AN ORGANIZED HEALTH CARE EDUCATION/TRAINING PROGRAM

## 2021-09-19 PROCEDURE — 83605 ASSAY OF LACTIC ACID: CPT | Performed by: STUDENT IN AN ORGANIZED HEALTH CARE EDUCATION/TRAINING PROGRAM

## 2021-09-19 PROCEDURE — 74177 CT ABD & PELVIS W/CONTRAST: CPT

## 2021-09-19 PROCEDURE — 85730 THROMBOPLASTIN TIME PARTIAL: CPT | Performed by: STUDENT IN AN ORGANIZED HEALTH CARE EDUCATION/TRAINING PROGRAM

## 2021-09-19 PROCEDURE — 85610 PROTHROMBIN TIME: CPT | Performed by: STUDENT IN AN ORGANIZED HEALTH CARE EDUCATION/TRAINING PROGRAM

## 2021-09-19 PROCEDURE — 25010000002 HYDROMORPHONE PER 4 MG: Performed by: STUDENT IN AN ORGANIZED HEALTH CARE EDUCATION/TRAINING PROGRAM

## 2021-09-19 PROCEDURE — 96374 THER/PROPH/DIAG INJ IV PUSH: CPT

## 2021-09-19 PROCEDURE — 25010000002 KETOROLAC TROMETHAMINE PER 15 MG: Performed by: STUDENT IN AN ORGANIZED HEALTH CARE EDUCATION/TRAINING PROGRAM

## 2021-09-19 PROCEDURE — 99283 EMERGENCY DEPT VISIT LOW MDM: CPT

## 2021-09-19 PROCEDURE — 81001 URINALYSIS AUTO W/SCOPE: CPT | Performed by: STUDENT IN AN ORGANIZED HEALTH CARE EDUCATION/TRAINING PROGRAM

## 2021-09-19 PROCEDURE — 96375 TX/PRO/DX INJ NEW DRUG ADDON: CPT

## 2021-09-19 RX ORDER — HYDROMORPHONE HYDROCHLORIDE 1 MG/ML
0.5 INJECTION, SOLUTION INTRAMUSCULAR; INTRAVENOUS; SUBCUTANEOUS ONCE
Status: COMPLETED | OUTPATIENT
Start: 2021-09-19 | End: 2021-09-19

## 2021-09-19 RX ORDER — KETOROLAC TROMETHAMINE 15 MG/ML
15 INJECTION, SOLUTION INTRAMUSCULAR; INTRAVENOUS ONCE
Status: COMPLETED | OUTPATIENT
Start: 2021-09-19 | End: 2021-09-19

## 2021-09-19 RX ADMIN — HYDROMORPHONE HYDROCHLORIDE 0.5 MG: 1 INJECTION, SOLUTION INTRAMUSCULAR; INTRAVENOUS; SUBCUTANEOUS at 23:57

## 2021-09-19 RX ADMIN — SODIUM CHLORIDE, POTASSIUM CHLORIDE, SODIUM LACTATE AND CALCIUM CHLORIDE 1000 ML: 600; 310; 30; 20 INJECTION, SOLUTION INTRAVENOUS at 23:23

## 2021-09-19 RX ADMIN — KETOROLAC TROMETHAMINE 15 MG: 15 INJECTION, SOLUTION INTRAMUSCULAR; INTRAVENOUS at 23:11

## 2021-09-20 ENCOUNTER — APPOINTMENT (OUTPATIENT)
Dept: ULTRASOUND IMAGING | Facility: HOSPITAL | Age: 41
End: 2021-09-20

## 2021-09-20 VITALS
TEMPERATURE: 98 F | OXYGEN SATURATION: 98 % | HEIGHT: 71 IN | SYSTOLIC BLOOD PRESSURE: 188 MMHG | DIASTOLIC BLOOD PRESSURE: 99 MMHG | WEIGHT: 185 LBS | HEART RATE: 76 BPM | BODY MASS INDEX: 25.9 KG/M2 | RESPIRATION RATE: 20 BRPM

## 2021-09-20 LAB
C TRACH RRNA CVX QL NAA+PROBE: NOT DETECTED
N GONORRHOEA RRNA SPEC QL NAA+PROBE: NOT DETECTED

## 2021-09-20 PROCEDURE — 87591 N.GONORRHOEAE DNA AMP PROB: CPT | Performed by: STUDENT IN AN ORGANIZED HEALTH CARE EDUCATION/TRAINING PROGRAM

## 2021-09-20 PROCEDURE — 25010000002 CEFTRIAXONE PER 250 MG: Performed by: STUDENT IN AN ORGANIZED HEALTH CARE EDUCATION/TRAINING PROGRAM

## 2021-09-20 PROCEDURE — 96372 THER/PROPH/DIAG INJ SC/IM: CPT

## 2021-09-20 PROCEDURE — 25010000002 IOPAMIDOL 61 % SOLUTION: Performed by: STUDENT IN AN ORGANIZED HEALTH CARE EDUCATION/TRAINING PROGRAM

## 2021-09-20 PROCEDURE — 87491 CHLMYD TRACH DNA AMP PROBE: CPT | Performed by: STUDENT IN AN ORGANIZED HEALTH CARE EDUCATION/TRAINING PROGRAM

## 2021-09-20 PROCEDURE — 25010000003 LIDOCAINE 1 % SOLUTION 20 ML VIAL: Performed by: STUDENT IN AN ORGANIZED HEALTH CARE EDUCATION/TRAINING PROGRAM

## 2021-09-20 PROCEDURE — 93976 VASCULAR STUDY: CPT

## 2021-09-20 RX ORDER — DOXYCYCLINE 100 MG/1
100 CAPSULE ORAL 2 TIMES DAILY
Qty: 20 CAPSULE | Refills: 0 | Status: SHIPPED | OUTPATIENT
Start: 2021-09-20 | End: 2021-09-30

## 2021-09-20 RX ORDER — DIAZEPAM 5 MG/1
5 TABLET ORAL ONCE
Status: COMPLETED | OUTPATIENT
Start: 2021-09-20 | End: 2021-09-20

## 2021-09-20 RX ORDER — PHENAZOPYRIDINE HYDROCHLORIDE 100 MG/1
100 TABLET, FILM COATED ORAL 3 TIMES DAILY PRN
Qty: 6 TABLET | Refills: 0 | Status: SHIPPED | OUTPATIENT
Start: 2021-09-20 | End: 2021-09-22

## 2021-09-20 RX ORDER — LOSARTAN POTASSIUM 50 MG/1
50 TABLET ORAL
Status: DISCONTINUED | OUTPATIENT
Start: 2021-09-20 | End: 2021-09-20 | Stop reason: HOSPADM

## 2021-09-20 RX ORDER — PHENAZOPYRIDINE HYDROCHLORIDE 200 MG/1
200 TABLET, FILM COATED ORAL ONCE
Status: COMPLETED | OUTPATIENT
Start: 2021-09-20 | End: 2021-09-20

## 2021-09-20 RX ADMIN — IOPAMIDOL 100 ML: 612 INJECTION, SOLUTION INTRAVENOUS at 00:05

## 2021-09-20 RX ADMIN — DIAZEPAM 5 MG: 5 TABLET ORAL at 01:26

## 2021-09-20 RX ADMIN — PHENAZOPYRIDINE HYDROCHLORIDE 200 MG: 200 TABLET ORAL at 01:26

## 2021-09-20 RX ADMIN — LOSARTAN POTASSIUM 50 MG: 50 TABLET, FILM COATED ORAL at 02:04

## 2021-09-20 RX ADMIN — CEFTRIAXONE SODIUM 500 MG: 500 INJECTION, POWDER, FOR SOLUTION INTRAMUSCULAR; INTRAVENOUS at 01:55

## 2021-09-20 NOTE — DISCHARGE INSTRUCTIONS
You were evaluated in the ER for abdominal pain. Your workup showed no indication at this time for admission to the hospital. Please use tylenol, ibuprofen, antibiotics for symptomatic improvement.     It is VERY IMPORTANT that you follow up (call them to set up an appointment) with your primary care doctor* within the next few days or as soon as possible so that you can be re-evaluated for improvement in your symptoms or for any other questions. If you were prescribed any medications, please take them as directed or call us back with any questions.     Return to the ER within 24-48 hours if you have any new symptoms, worsening symptoms, or any other concerns.

## 2021-09-20 NOTE — ED PROVIDER NOTES
Subjective   Patient states that he has been having testicular pain and suprapubic tenderness.  States that he is also been having discharge.  States that is not having any fevers or chills.  Denies any hematuria, hematochezia, melena, chest pain, shortness of breath or other symptoms.  States that he was recently diagnosed with possible epididymitis and was supposed to be taking antibiotics and follow-up with urology but is not been able to do so.  States that the pain is severe and he does not know why its occurring. Nothing has helped. He has also not taken his blood pressure medication.          Review of Systems   All other systems reviewed and are negative.      Past Medical History:   Diagnosis Date   • Patient denies medical problems        Allergies   Allergen Reactions   • Adhesive Tape Rash       Past Surgical History:   Procedure Laterality Date   • NO PAST SURGERIES         History reviewed. No pertinent family history.    Social History     Socioeconomic History   • Marital status:      Spouse name: Not on file   • Number of children: Not on file   • Years of education: Not on file   • Highest education level: Not on file   Tobacco Use   • Smoking status: Current Every Day Smoker     Packs/day: 0.50   Substance and Sexual Activity   • Alcohol use: Not Currently   • Drug use: Not Currently           Objective   Physical Exam  Vitals and nursing note reviewed.   Constitutional:       General: He is not in acute distress.     Appearance: He is not toxic-appearing or diaphoretic.   HENT:      Head: Normocephalic and atraumatic.      Nose: Nose normal.   Eyes:      General:         Right eye: No discharge.         Left eye: No discharge.      Extraocular Movements: Extraocular movements intact.      Conjunctiva/sclera: Conjunctivae normal.   Cardiovascular:      Rate and Rhythm: Normal rate.   Pulmonary:      Effort: Pulmonary effort is normal. No respiratory distress.      Breath sounds: No  stridor. No rhonchi.   Abdominal:      General: Abdomen is flat.      Tenderness: There is abdominal tenderness in the suprapubic area.   Genitourinary:     Penis: Normal.       Testes: Cremasteric reflex is present.         Right: Tenderness present.         Left: Tenderness present.      Comments: Rectal exam deferred  Musculoskeletal:         General: Normal range of motion.      Cervical back: No rigidity.   Skin:     General: Skin is warm.   Neurological:      General: No focal deficit present.      Mental Status: He is alert and oriented to person, place, and time.      Cranial Nerves: No cranial nerve deficit.      Sensory: No sensory deficit.      Motor: No weakness.   Psychiatric:         Mood and Affect: Mood normal.         Behavior: Behavior normal.         Thought Content: Thought content normal.         Judgment: Judgment normal.         Procedures           ED Course                                           MDM  Number of Diagnoses or Management Options  Painful bladder spasm  Diagnosis management comments: This is a 41yoM presenting with abdominal pain. Patient arrived hemodynamically stable, although hypertensive (due to not taking his medication) and was afebrile. Abdominal exam without peritoneal signs. No evidence of acute abodmen at this time. Differentials include, but are not limited to constipation, pancreatitis, bowel obstruction, diverticulitis, vascular catastrophe. Presentation not consistent with other acute, emergent causes of abdominal pain at this time.     Plan to obtain cbc, cmp, ua, urine culture, CT abdomen/pelvis with contrast, treat for possible STDs, administer IV fluids, control pain, and reassess.     The imaging and other workup was reviewed and found to have evidence of possible bladder thickening and bladder spasms. Pain controlled at this time. I reassessed the patient and discussed the findings of the work up so far. He was comfortable. I explained my impression of the  workup to him and addressed all of his questions regarding the emergency department evaluation, diagnosis, and treatment plan in plain and simple language that he was able to understand. I encouraged him to take his blood pressure medication.    He voiced agreement with the plan of care so far and had no further questions. I told him that there is always some diagnostic uncertainty in the ER and that his work up, physical exam, and even his current presentation may not always reveal other underlying conditions. I also went over the fact that his condition may change or show itself after being discharged. He expressed understanding and agreed that there are reasonable limitations with the practice of emergency medicine.    I gave him return precautions and told him to return to the emergency department within 24 - 48hrs if he has any new, worsening, or concerning symptoms.     I told him that it is VERY IMPORTANT that he follows up (by calling to set up an appointment) with his primary care doctor within the next few days or as soon as reasonably possible so that he can be re-evaluated for improvement in his symptoms or for any other questions. He verbalized understanding of these instructions.     He was discharged in stable condition and was observed ambulating out of the ER.          Amount and/or Complexity of Data Reviewed  Clinical lab tests: reviewed and ordered  Tests in the radiology section of CPT®: ordered and reviewed    Risk of Complications, Morbidity, and/or Mortality  Presenting problems: moderate  Diagnostic procedures: moderate  Management options: moderate        Final diagnoses:   Painful bladder spasm       ED Disposition  ED Disposition     ED Disposition Condition Comment    Discharge Stable           Provider, No Known  [facility]  [place] [address]  [phone]    Call in 1 day  As needed, If symptoms worsen         Medication List      New Prescriptions    phenazopyridine 100 MG  tablet  Commonly known as: PYRIDIUM  Take 1 tablet by mouth 3 (Three) Times a Day As Needed for Bladder Spasms for up to 2 days.           Where to Get Your Medications      These medications were sent to [facility] - [address] - [phone]  - [phone] [address] [place] [address]    Phone: [phone]   · doxycycline 100 MG capsule  · phenazopyridine 100 MG tablet          Peg Martin MD  09/20/21 6625

## 2022-03-14 ENCOUNTER — HOSPITAL ENCOUNTER (EMERGENCY)
Age: 42
Discharge: HOME | End: 2022-03-14
Attending: EMERGENCY MEDICINE
Payer: MEDICAID

## 2022-03-14 ENCOUNTER — APPOINTMENT (OUTPATIENT)
Dept: CT IMAGING | Age: 42
End: 2022-03-14
Payer: MEDICAID

## 2022-03-14 ENCOUNTER — APPOINTMENT (OUTPATIENT)
Dept: ULTRASOUND IMAGING | Age: 42
End: 2022-03-14
Payer: MEDICAID

## 2022-03-14 VITALS
SYSTOLIC BLOOD PRESSURE: 148 MMHG | DIASTOLIC BLOOD PRESSURE: 97 MMHG | OXYGEN SATURATION: 98 % | BODY MASS INDEX: 28.59 KG/M2 | WEIGHT: 205 LBS | HEART RATE: 77 BPM | RESPIRATION RATE: 18 BRPM | TEMPERATURE: 98.3 F

## 2022-03-14 DIAGNOSIS — F19.10 POLYSUBSTANCE ABUSE (HCC): ICD-10-CM

## 2022-03-14 DIAGNOSIS — N45.1 EPIDIDYMITIS, LEFT: Primary | ICD-10-CM

## 2022-03-14 LAB
ALBUMIN SERPL-MCNC: 4.2 G/DL (ref 3.5–5.2)
ALP BLD-CCNC: 102 U/L (ref 40–130)
ALT SERPL-CCNC: 12 U/L (ref 5–41)
AMPHETAMINE SCREEN, URINE: POSITIVE
ANION GAP SERPL CALCULATED.3IONS-SCNC: 10 MMOL/L (ref 7–19)
AST SERPL-CCNC: 16 U/L (ref 5–40)
BACTERIA: ABNORMAL /HPF
BARBITURATE SCREEN URINE: NEGATIVE
BASOPHILS ABSOLUTE: 0.1 K/UL (ref 0–0.2)
BASOPHILS RELATIVE PERCENT: 0.4 % (ref 0–1)
BENZODIAZEPINE SCREEN, URINE: NEGATIVE
BILIRUB SERPL-MCNC: <0.2 MG/DL (ref 0.2–1.2)
BILIRUBIN URINE: NEGATIVE
BLOOD, URINE: NEGATIVE
BUN BLDV-MCNC: 12 MG/DL (ref 6–20)
CALCIUM SERPL-MCNC: 9.4 MG/DL (ref 8.6–10)
CANNABINOID SCREEN URINE: POSITIVE
CHLORIDE BLD-SCNC: 104 MMOL/L (ref 98–111)
CLARITY: CLEAR
CO2: 24 MMOL/L (ref 22–29)
COCAINE METABOLITE SCREEN URINE: NEGATIVE
COLOR: ABNORMAL
CREAT SERPL-MCNC: 1 MG/DL (ref 0.5–1.2)
CRYSTALS, UA: ABNORMAL /HPF
CRYSTALS, UA: ABNORMAL /HPF
EOSINOPHILS ABSOLUTE: 0.1 K/UL (ref 0–0.6)
EOSINOPHILS RELATIVE PERCENT: 1.1 % (ref 0–5)
EPITHELIAL CELLS, UA: ABNORMAL /HPF
GFR AFRICAN AMERICAN: >59
GFR NON-AFRICAN AMERICAN: >60
GLUCOSE BLD-MCNC: 108 MG/DL (ref 74–109)
GLUCOSE URINE: NEGATIVE MG/DL
HCT VFR BLD CALC: 48.7 % (ref 42–52)
HEMOGLOBIN: 16 G/DL (ref 14–18)
IMMATURE GRANULOCYTES #: 0 K/UL
KETONES, URINE: 15 MG/DL
LEUKOCYTE ESTERASE, URINE: ABNORMAL
LYMPHOCYTES ABSOLUTE: 1.6 K/UL (ref 1.1–4.5)
LYMPHOCYTES RELATIVE PERCENT: 13.5 % (ref 20–40)
Lab: ABNORMAL
MCH RBC QN AUTO: 30.9 PG (ref 27–31)
MCHC RBC AUTO-ENTMCNC: 32.9 G/DL (ref 33–37)
MCV RBC AUTO: 94.2 FL (ref 80–94)
MONOCYTES ABSOLUTE: 0.8 K/UL (ref 0–0.9)
MONOCYTES RELATIVE PERCENT: 6.6 % (ref 0–10)
MUCUS: ABNORMAL /LPF
NEUTROPHILS ABSOLUTE: 8.9 K/UL (ref 1.5–7.5)
NEUTROPHILS RELATIVE PERCENT: 78.1 % (ref 50–65)
NITRITE, URINE: NEGATIVE
OPIATE SCREEN URINE: POSITIVE
PDW BLD-RTO: 12.5 % (ref 11.5–14.5)
PH UA: 5.5 (ref 5–8)
PLATELET # BLD: 250 K/UL (ref 130–400)
PMV BLD AUTO: 10.6 FL (ref 9.4–12.4)
POTASSIUM SERPL-SCNC: 4.5 MMOL/L (ref 3.5–5)
PROTEIN UA: 100 MG/DL
RBC # BLD: 5.17 M/UL (ref 4.7–6.1)
RBC UA: ABNORMAL /HPF (ref 0–2)
SODIUM BLD-SCNC: 138 MMOL/L (ref 136–145)
SPECIFIC GRAVITY UA: 1.04 (ref 1–1.03)
TOTAL PROTEIN: 7.2 G/DL (ref 6.6–8.7)
UROBILINOGEN, URINE: 1 E.U./DL
WBC # BLD: 11.4 K/UL (ref 4.8–10.8)
WBC UA: ABNORMAL /HPF (ref 0–5)

## 2022-03-14 PROCEDURE — 36415 COLL VENOUS BLD VENIPUNCTURE: CPT

## 2022-03-14 PROCEDURE — 85025 COMPLETE CBC W/AUTO DIFF WBC: CPT

## 2022-03-14 PROCEDURE — 81003 URINALYSIS AUTO W/O SCOPE: CPT

## 2022-03-14 PROCEDURE — 74177 CT ABD & PELVIS W/CONTRAST: CPT

## 2022-03-14 PROCEDURE — 80053 COMPREHEN METABOLIC PANEL: CPT

## 2022-03-14 PROCEDURE — 96376 TX/PRO/DX INJ SAME DRUG ADON: CPT

## 2022-03-14 PROCEDURE — 80307 DRUG TEST PRSMV CHEM ANLYZR: CPT

## 2022-03-14 PROCEDURE — 76870 US EXAM SCROTUM: CPT

## 2022-03-14 PROCEDURE — 6360000004 HC RX CONTRAST MEDICATION: Performed by: EMERGENCY MEDICINE

## 2022-03-14 PROCEDURE — 96375 TX/PRO/DX INJ NEW DRUG ADDON: CPT

## 2022-03-14 PROCEDURE — 6360000002 HC RX W HCPCS: Performed by: EMERGENCY MEDICINE

## 2022-03-14 PROCEDURE — 96374 THER/PROPH/DIAG INJ IV PUSH: CPT

## 2022-03-14 PROCEDURE — 99283 EMERGENCY DEPT VISIT LOW MDM: CPT

## 2022-03-14 RX ORDER — ONDANSETRON 2 MG/ML
4 INJECTION INTRAMUSCULAR; INTRAVENOUS ONCE
Status: COMPLETED | OUTPATIENT
Start: 2022-03-14 | End: 2022-03-14

## 2022-03-14 RX ORDER — LOSARTAN POTASSIUM 25 MG/1
25 TABLET ORAL DAILY
COMMUNITY
End: 2022-03-17 | Stop reason: ALTCHOICE

## 2022-03-14 RX ORDER — LEVOFLOXACIN 500 MG/1
500 TABLET, FILM COATED ORAL DAILY
Qty: 10 TABLET | Refills: 0 | Status: SHIPPED | OUTPATIENT
Start: 2022-03-14 | End: 2022-03-24

## 2022-03-14 RX ORDER — HYDROMORPHONE HYDROCHLORIDE 1 MG/ML
0.5 INJECTION, SOLUTION INTRAMUSCULAR; INTRAVENOUS; SUBCUTANEOUS ONCE
Status: COMPLETED | OUTPATIENT
Start: 2022-03-14 | End: 2022-03-14

## 2022-03-14 RX ORDER — KETOROLAC TROMETHAMINE 30 MG/ML
30 INJECTION, SOLUTION INTRAMUSCULAR; INTRAVENOUS ONCE
Status: COMPLETED | OUTPATIENT
Start: 2022-03-14 | End: 2022-03-14

## 2022-03-14 RX ORDER — HYDROMORPHONE HYDROCHLORIDE 1 MG/ML
1 INJECTION, SOLUTION INTRAMUSCULAR; INTRAVENOUS; SUBCUTANEOUS ONCE
Status: COMPLETED | OUTPATIENT
Start: 2022-03-14 | End: 2022-03-14

## 2022-03-14 RX ADMIN — HYDROMORPHONE HYDROCHLORIDE 0.5 MG: 1 INJECTION, SOLUTION INTRAMUSCULAR; INTRAVENOUS; SUBCUTANEOUS at 20:59

## 2022-03-14 RX ADMIN — HYDROMORPHONE HYDROCHLORIDE 1 MG: 1 INJECTION, SOLUTION INTRAMUSCULAR; INTRAVENOUS; SUBCUTANEOUS at 18:18

## 2022-03-14 RX ADMIN — ONDANSETRON 4 MG: 2 INJECTION INTRAMUSCULAR; INTRAVENOUS at 18:19

## 2022-03-14 RX ADMIN — IOPAMIDOL 90 ML: 755 INJECTION, SOLUTION INTRAVENOUS at 19:18

## 2022-03-14 RX ADMIN — KETOROLAC TROMETHAMINE 30 MG: 30 INJECTION, SOLUTION INTRAMUSCULAR at 21:03

## 2022-03-14 ASSESSMENT — ENCOUNTER SYMPTOMS
SORE THROAT: 0
BACK PAIN: 0
ABDOMINAL PAIN: 1
DIARRHEA: 0
SHORTNESS OF BREATH: 0
NAUSEA: 1
COUGH: 0
RHINORRHEA: 0
VOMITING: 1

## 2022-03-14 ASSESSMENT — PAIN SCALES - GENERAL
PAINLEVEL_OUTOF10: 10
PAINLEVEL_OUTOF10: 8
PAINLEVEL_OUTOF10: 10
PAINLEVEL_OUTOF10: 8

## 2022-03-14 NOTE — ED PROVIDER NOTES
[facility] EMERGENCY DEPT  eMERGENCY dEPARTMENT eNCOUnter      Pt Name: Patricio Laura  MRN: [id number]  [place] 1980  Date of evaluation: 3/14/2022  Provider: Yakov Cho MD    [address] [place] [address]       Chief Complaint   Patient presents with    Testicle Pain         HISTORY OF PRESENT ILLNESS   (Location/Symptom, Timing/Onset,Context/Setting, Quality, Duration, Modifying Factors, Severity)  Note limiting factors. Patricio Laura is a 42 y.o. male who presents to the emergency department for testicular pain and lower abdominal pain. States this began acutely around 2 PM yesterday afternoon. Tells me has had similar discomfort intermittently since 2015. Was seen at [facility] in September 2021 with a negative CT scan of his abdomen as well as scrotal ultrasound. Tells me he has vomited once. Denies any UTI symptoms. No penile discharge. No scrotal trauma. HPI    NursingNotes were reviewed. REVIEW OF SYSTEMS    (2-9 systems for level 4, 10 or more for level 5)     Review of Systems   Constitutional: Negative for chills and fever. HENT: Negative for rhinorrhea and sore throat. Respiratory: Negative for cough and shortness of breath. Cardiovascular: Negative for chest pain. Gastrointestinal: Positive for abdominal pain, nausea and vomiting. Negative for diarrhea. Genitourinary: Positive for testicular pain. Negative for dysuria, flank pain, frequency, penile discharge, penile swelling and scrotal swelling. Musculoskeletal: Negative for back pain and neck pain. Neurological: Negative for dizziness and headaches. All other systems reviewed and are negative. PAST MEDICALHISTORY     Past Medical History:   Diagnosis Date    ADHD     Bipolar 1 disorder ([facility] [address]          SURGICAL HISTORY     History reviewed. No pertinent surgical history.       CURRENT MEDICATIONS     Discharge Medication List as of 3/14/2022  8:49 PM      CONTINUE these medications which have NOT CHANGED    Details losartan (COZAAR) 25 MG tablet Take 25 mg by mouth dailyHistorical Med             ALLERGIES     Adhesive tape    FAMILY HISTORY     History reviewed. No pertinent family history. SOCIAL HISTORY       Social History     Socioeconomic History    Marital status:      Spouse name: None    Number of children: None    Years of education: None    Highest education level: None   Occupational History    None   Tobacco Use    Smoking status: Current Every Day Smoker     Packs/day: 0.50     Types: Cigarettes    Smokeless tobacco: Never Used   Substance and Sexual Activity    Alcohol use: Yes     Comment: occ    Drug use: Not Currently    Sexual activity: None   Other Topics Concern    None   Social History Narrative    None     Social Determinants of Health     Financial Resource Strain:     Difficulty of Paying Living Expenses: Not on file   Food Insecurity:     Worried About Running Out of Food in the Last Year: Not on file    Huber of Food in the Last Year: Not on file   Transportation Needs:     Lack of Transportation (Medical): Not on file    Lack of Transportation (Non-Medical):  Not on file   Physical Activity:     Days of Exercise per Week: Not on file    Minutes of Exercise per Session: Not on file   Stress:     Feeling of Stress : Not on file   Social Connections:     Frequency of Communication with Friends and Family: Not on file    Frequency of Social Gatherings with Friends and Family: Not on file    Attends Holiness Services: Not on file    Active Member of Clubs or Organizations: Not on file    Attends Club or Organization Meetings: Not on file    Marital Status: Not on file   Intimate Partner Violence:     Fear of Current or Ex-Partner: Not on file    Emotionally Abused: Not on file    Physically Abused: Not on file    Sexually Abused: Not on file   Housing Stability:     Unable to Pay for Housing in the Last Year: Not on file    Number of [place] in the Last Year: Not on file    Unstable Housing in the Last Year: Not on file       SCREENINGS    Rafael Coma Scale  Eye Opening: Spontaneous  Best Verbal Response: Oriented  Best Motor Response: Obeys commands  Rafael Coma Scale Score: 15        PHYSICAL EXAM    (up to 7 for level 4, 8 or more for level 5)     ED Triage Vitals   BP Temp Temp Source Pulse Resp SpO2 Height Weight   03/14/22 1742 03/14/22 1745 03/14/22 1742 03/14/22 1742 03/14/22 1742 03/14/22 1742 -- 03/14/22 1742   (!) 148/97 98.3 °F (36.8 °C) Oral 77 18 98 %  205 lb (93 kg)       Physical Exam  Vitals and nursing note reviewed. Exam conducted with a chaperone present. Constitutional:       Appearance: He is well-developed. He is ill-appearing. He is not diaphoretic. Comments: Anxious moving around on bed appears uncomfortable   HENT:      Head: Normocephalic and atraumatic. Eyes:      Conjunctiva/sclera: Conjunctivae normal.   Neck:      Trachea: No tracheal deviation. Cardiovascular:      Rate and Rhythm: Normal rate and regular rhythm. Heart sounds: Normal heart sounds. No murmur heard. Pulmonary:      Breath sounds: Normal breath sounds. No wheezing or rales. Abdominal:      Palpations: Abdomen is soft. There is no mass. Tenderness: There is abdominal tenderness in the suprapubic area and left lower quadrant. There is no right CVA tenderness or left CVA tenderness. Hernia: There is no hernia in the left inguinal area or right inguinal area. Genitourinary:     Penis: Normal and circumcised. No tenderness, discharge or swelling. Testes:         Right: Tenderness present. Mass or swelling not present. Left: Tenderness present. Mass or swelling not present. Epididymis:      Right: Tenderness present. Left: Tenderness present. Musculoskeletal:         General: Normal range of motion. Cervical back: Normal range of motion and neck supple. Skin:     General: Skin is warm and dry. Neurological:      Mental Status: He is alert and oriented to person, place, and time. DIAGNOSTIC RESULTS         RADIOLOGY:  Non-plain film images such as CT, Ultrasound and MRI are read by the radiologist. Plain radiographic images are visualized and preliminarily interpreted bythe emergency physician with the below findings:        CT ABDOMEN PELVIS W IV CONTRAST Additional Contrast? None   Final Result   1. Normal bowel gas pattern with no obstruction or free air. 2. The soft tissue organs within the upper abdomen are unremarkable. No evidence of nephrolithiasis or obstructive uropathy. 3. Fat-containing periumbilical hernia. 4. The urinary bladder cannot be fully assessed secondary to   evacuation. There are phleboliths within the pelvis which all appear   to be separable from the distal ureters. .    5. Stable subpleural nodules within both lung bases suggesting   benignity. Signed by Dr Toni Stevens   Final Result   1. Increase in flow to the left epididymis compared to the right   epididymis most likely representing left epididymitis. 2. Color flow is present to the right and left testicle.    Signed by Dr Zachery Guerra:  Alie Madrigal CULTURE - Abnormal; Notable for the following components:       Result Value    Color, UA DARK YELLOW (*)     Ketones, Urine 15 (*)     Protein,  (*)     Leukocyte Esterase, Urine TRACE (*)     All other components within normal limits   CBC WITH AUTO DIFFERENTIAL - Abnormal; Notable for the following components:    WBC 11.4 (*)     MCV 94.2 (*)     MCHC 32.9 (*)     Neutrophils % 78.1 (*)     Lymphocytes % 13.5 (*)     Neutrophils Absolute 8.9 (*)     All other components within normal limits   URINE DRUG SCREEN - Abnormal; Notable for the following components:    Amphetamine Screen, Urine Positive (*)     Cannabinoid Scrn, Ur Positive (*)     Opiate Scrn, Ur Positive (*) All other components within normal limits   MICROSCOPIC URINALYSIS - Abnormal; Notable for the following components:    Mucus, UA 3+ (*)     Bacteria, UA None Seen (*)     Crystals, UA 3+ Ca. Oxalate (*)     Crystals, UA 3+ Ca. Carb (*)     All other components within normal limits   C.TRACHOMATIS N.GONORRHOEAE DNA   COMPREHENSIVE METABOLIC PANEL       All other labs were within normal range or not returned as of this dictation. EMERGENCY DEPARTMENT COURSE and DIFFERENTIAL DIAGNOSIS/MDM:   Vitals:    Vitals:    03/14/22 1742 03/14/22 1745   BP: (!) 148/97    Pulse: 77    Resp: 18    Temp:  98.3 °F (36.8 °C)   TempSrc: Oral Oral   SpO2: 98%    Weight: 205 lb (93 kg)        MDM  Number of Diagnoses or Management Options     Amount and/or Complexity of Data Reviewed  Clinical lab tests: ordered and reviewed  Tests in the radiology section of CPT®: reviewed and ordered  Discuss the patient with other providers: yes  Independent visualization of images, tracings, or specimens: yes        Per pt has had recurrent pain and issues since 2015. Never has seen a urologist.  Quite anxious here. Suspect likely related to his UDS as +amphetamines, THC and opiates. No rx for any of these. He is not psychotic. Possibly left sided epididymitis given his tenderness will cover with antibx. Otherwise imaging reassuring. Understands follow up and return precautions    CONSULTS:  None    PROCEDURES:  Unless otherwise noted below, none     Procedures    FINAL IMPRESSION      1. Epididymitis, left    2.  Polysubstance abuse [facility])          DISPOSITION/PLAN   DISPOSITION Decision To Discharge 03/14/2022 08:20:41 PM      PATIENT REFERRED TO:  Cyndi Barrios MD  [address] [place] [address]  [phone]    Schedule an appointment as soon as possible for a visit in 1 week        DISCHARGE MEDICATIONS:  Discharge Medication List as of 3/14/2022  8:49 PM      START taking these medications    Details

## 2022-03-15 NOTE — ED NOTES
Pt states that he cannot urinate at this time. Pt request ice water. Per Dr. Yulisa Pete pt can have water.       Marcie Levine RN  03/14/22 1913

## 2022-03-17 ENCOUNTER — OFFICE VISIT (OUTPATIENT)
Dept: UROLOGY | Age: 42
End: 2022-03-17
Payer: MEDICAID

## 2022-03-17 VITALS
SYSTOLIC BLOOD PRESSURE: 138 MMHG | TEMPERATURE: 97.7 F | WEIGHT: 221.4 LBS | DIASTOLIC BLOOD PRESSURE: 72 MMHG | BODY MASS INDEX: 29.99 KG/M2 | HEIGHT: 72 IN

## 2022-03-17 DIAGNOSIS — N50.811 PAIN IN BOTH TESTICLES: Primary | ICD-10-CM

## 2022-03-17 DIAGNOSIS — R10.32 BILATERAL GROIN PAIN: ICD-10-CM

## 2022-03-17 DIAGNOSIS — N50.812 PAIN IN BOTH TESTICLES: Primary | ICD-10-CM

## 2022-03-17 DIAGNOSIS — R10.31 BILATERAL GROIN PAIN: ICD-10-CM

## 2022-03-17 LAB
APPEARANCE FLUID: CLEAR
BILIRUBIN, POC: NORMAL
BLOOD URINE, POC: NORMAL
CLARITY, POC: CLEAR
COLOR, POC: YELLOW
GLUCOSE URINE, POC: NORMAL
KETONES, POC: NORMAL
LEUKOCYTE EST, POC: NORMAL
NITRITE, POC: NORMAL
PH, POC: 6
PROTEIN, POC: NORMAL
SPECIFIC GRAVITY, POC: 1.03
UROBILINOGEN, POC: 0.2

## 2022-03-17 PROCEDURE — 81002 URINALYSIS NONAUTO W/O SCOPE: CPT | Performed by: NURSE PRACTITIONER

## 2022-03-17 PROCEDURE — 99204 OFFICE O/P NEW MOD 45 MIN: CPT | Performed by: NURSE PRACTITIONER

## 2022-03-17 RX ORDER — KETOROLAC TROMETHAMINE 10 MG/1
10 TABLET, FILM COATED ORAL EVERY 6 HOURS PRN
Qty: 12 TABLET | Refills: 0 | Status: SHIPPED | OUTPATIENT
Start: 2022-03-17

## 2022-03-17 RX ORDER — KETOROLAC TROMETHAMINE 30 MG/ML
30 INJECTION, SOLUTION INTRAMUSCULAR; INTRAVENOUS ONCE
Status: COMPLETED | OUTPATIENT
Start: 2022-03-17 | End: 2022-03-17

## 2022-03-17 RX ORDER — LEVOFLOXACIN 500 MG/1
500 TABLET, FILM COATED ORAL DAILY
Qty: 11 TABLET | Refills: 0 | Status: SHIPPED | OUTPATIENT
Start: 2022-03-17 | End: 2022-03-28

## 2022-03-17 RX ORDER — CEFTRIAXONE SODIUM 250 MG/1
250 INJECTION, POWDER, FOR SOLUTION INTRAMUSCULAR; INTRAVENOUS ONCE
Status: COMPLETED | OUTPATIENT
Start: 2022-03-17 | End: 2022-03-17

## 2022-03-17 RX ADMIN — KETOROLAC TROMETHAMINE 30 MG: 30 INJECTION, SOLUTION INTRAMUSCULAR; INTRAVENOUS at 14:55

## 2022-03-17 RX ADMIN — CEFTRIAXONE SODIUM 250 MG: 250 INJECTION, POWDER, FOR SOLUTION INTRAMUSCULAR; INTRAVENOUS at 14:52

## 2022-03-17 ASSESSMENT — ENCOUNTER SYMPTOMS
NAUSEA: 0
ABDOMINAL PAIN: 0
ABDOMINAL DISTENTION: 0
VOMITING: 0
BACK PAIN: 0

## 2022-03-17 NOTE — PROGRESS NOTES
Janel York is a 42 y.o., male, New patient who presents today   Chief Complaint   Patient presents with    New Patient     I am here today for epididymitis        HPI   Patient presents for evaluation of testicular pain. His pain is bilateral, but he reports it is more severe on the left and has been occurring longer on the left side. He reports this pain has been present intermittently since 2015. The pain extends from his testicles up into his groin. He has been treated with antibiotics and anti-inflammatories in the past which were helpful for a short period of time, but the pain comes back within a few weeks of finishing therapy. He is currently on Levaquin, prescribed by Dr. Catie Burt in the emergency room. In September, when he was experiencing the same pain, he was prescribed a course of doxycycline. Again, both of these therapies have been only mildly helpful. He has undergone STD testing as well as urine cultures which have been negative. He reports that at times the pain is manageable, while other times, he has trouble even completing his ADLs because he is unable to stand up or walk. He denies any prior history of nephrolithiasis, hematuria. He has been evaluated several times with scrotal ultrasounds as well as CT images which appears to only reveal only possible epididymitis. REVIEW OF SYSTEMS:  Review of Systems   Constitutional: Negative for chills and fever. Gastrointestinal: Negative for abdominal distention, abdominal pain, nausea and vomiting. Genitourinary: Positive for testicular pain. Negative for difficulty urinating, dysuria, flank pain, frequency, hematuria, penile pain, penile swelling, scrotal swelling and urgency. Musculoskeletal: Negative for back pain and gait problem. Psychiatric/Behavioral: Negative for agitation and confusion.        PHYSICAL EXAM:  /72   Temp 97.7 °F (36.5 °C) (Temporal)   Ht 6' (1.829 m)   Wt 221 lb 6.4 oz (100.4 kg)   BMI 30.03 kg/m² Physical Exam  Vitals and nursing note reviewed. Constitutional:       General: He is not in acute distress. Appearance: Normal appearance. He is not ill-appearing. Pulmonary:      Effort: Pulmonary effort is normal. No respiratory distress. Abdominal:      General: There is no distension. Tenderness: There is no abdominal tenderness. There is no right CVA tenderness or left CVA tenderness. Hernia: There is no hernia in the left inguinal area or right inguinal area. Genitourinary:     Penis: Circumcised. No erythema, tenderness, discharge, swelling or lesions. Testes:         Right: Tenderness (mild) present. Mass, testicular hydrocele or varicocele not present. Left: Tenderness (worse than right) present. Mass, testicular hydrocele or varicocele not present. Epididymis:      Right: Not inflamed or enlarged. Tenderness (mild) present. Left: Enlarged. Tenderness present. Comments: Tenderness to bilateral groins on palpation as well  Neurological:      Mental Status: He is alert and oriented to person, place, and time. Mental status is at baseline. Psychiatric:         Mood and Affect: Mood normal.         Behavior: Behavior normal.         DATA:  Results for orders placed or performed in visit on 03/17/22   POCT Urinalysis no Micro   Result Value Ref Range    Color, UA yellow     Clarity, UA clear     Glucose, UA POC neg     Bilirubin, UA neg     Ketones, UA neg     Spec Grav, UA 1.030     Blood, UA POC neg     pH, UA 6.0     Protein, UA POC neg     Urobilinogen, UA 0.2     Leukocytes, UA neg     Nitrite, UA neg     Appearance, Fluid Clear Clear, Slightly Cloudy       IMAGING:  Impression   1. Increase in flow to the left epididymis compared to the right   epididymis most likely representing left epididymitis. 2. Color flow is present to the right and left testicle. Signed by Dr Sterling Moralez     Impression   1.  Normal bowel gas pattern with no obstruction or free air. 2. The soft tissue organs within the upper abdomen are unremarkable. No evidence of nephrolithiasis or obstructive uropathy. 3. Fat-containing periumbilical hernia. 4. The urinary bladder cannot be fully assessed secondary to   evacuation. There are phleboliths within the pelvis which all appear   to be separable from the distal ureters. .    5. Stable subpleural nodules within both lung bases suggesting   benignity. Signed by Dr Nikolas Wynne     I have reviewed the CT and scrotal ultrasound and agree with radiologist impression. Mother does appear to be some increased blood flow to the left epididymis, I do not see anything particularly abnormal about his imaging studies. Physical exam does not particularly correlate with epididymitis. ASSESSMENT/PLAN  1. Pain in both testicles  His pain is chronic and has been intermittently occurring for several years now. Antibiotics and anti-inflammatories are not satisfactory in relieving his pain. I would like to go ahead and make him referral to pain management as there appears to be nothing structural causing this issue. In the meantime, I will provide him another 11 days of fluoroquinolone therapy tonight 21 days total for treatment of epididymitis. I have also given him a shot of Rocephin in the office today as well as Toradol to help alleviate the pain. I have sent him home with a prescription of Toradol to begin tomorrow and last 3 days in order to help with his pain. - levoFLOXacin (LEVAQUIN) 500 MG tablet; Take 1 tablet by mouth daily for 11 days  Dispense: 11 tablet; Refill: 0  - [facility]  - ketorolac (TORADOL) 10 MG tablet; Take 1 tablet by mouth every 6 hours as needed for Pain  Dispense: 12 tablet; Refill: 0  - POCT Urinalysis no Micro    2. Bilateral groin pain  See above  - [facility]  - ketorolac (TORADOL) 10 MG tablet;  Take 1 tablet by mouth every 6 hours as needed for Pain  Dispense: 12 tablet; Refill: 0      Orders Placed This Encounter   Procedures    [facility]. Pain Medicine     Referral Priority:   Routine     Referral Type:   Eval and Treat     Referral Reason:   Specialty Services Required     Number of Visits Requested:   1    POCT Urinalysis no Micro        Return in about 4 weeks (around 4/14/2022). An electronic signature was used to authenticate this note. ELINA HEARD, APRN - CNP    All information inputted into the note by the MA to include chief complaint, past medical history, past surgical history, medications, allergies, social and family history and review of systems has been reviewed and updated as needed by me. EMR Dragon/transcription disclaimer: Much of this document is electronic transcription/translation of spoken language to printed text. The electronic translation of spoken language may be erroneous or, at times, nonsensical words or phrases may be inadvertently transcribed.  Although I have reviewed the document for such errors, some may still exist.

## 2022-03-24 ENCOUNTER — TELEPHONE (OUTPATIENT)
Dept: UROLOGY | Age: 42
End: 2022-03-24

## 2022-04-07 DIAGNOSIS — N50.811 PAIN IN BOTH TESTICLES: Primary | ICD-10-CM

## 2022-04-07 DIAGNOSIS — R10.32 BILATERAL GROIN PAIN: ICD-10-CM

## 2022-04-07 DIAGNOSIS — R10.31 BILATERAL GROIN PAIN: ICD-10-CM

## 2022-04-07 DIAGNOSIS — N50.812 PAIN IN BOTH TESTICLES: Primary | ICD-10-CM

## 2022-04-07 RX ORDER — IBUPROFEN 800 MG/1
800 TABLET ORAL EVERY 8 HOURS PRN
Qty: 90 TABLET | Refills: 1 | Status: SHIPPED | OUTPATIENT
Start: 2022-04-07

## 2022-04-11 ENCOUNTER — OFFICE VISIT (OUTPATIENT)
Dept: UROLOGY | Age: 42
End: 2022-04-11
Payer: MEDICAID

## 2022-04-11 VITALS
WEIGHT: 225 LBS | DIASTOLIC BLOOD PRESSURE: 99 MMHG | SYSTOLIC BLOOD PRESSURE: 169 MMHG | TEMPERATURE: 97.9 F | HEART RATE: 109 BPM | BODY MASS INDEX: 30.48 KG/M2 | OXYGEN SATURATION: 98 % | HEIGHT: 72 IN

## 2022-04-11 DIAGNOSIS — N50.811 PAIN IN BOTH TESTICLES: Primary | ICD-10-CM

## 2022-04-11 DIAGNOSIS — R10.31 BILATERAL GROIN PAIN: ICD-10-CM

## 2022-04-11 DIAGNOSIS — N50.812 PAIN IN BOTH TESTICLES: Primary | ICD-10-CM

## 2022-04-11 DIAGNOSIS — R10.32 BILATERAL GROIN PAIN: ICD-10-CM

## 2022-04-11 LAB
APPEARANCE FLUID: CLEAR
BILIRUBIN, POC: NORMAL
BLOOD URINE, POC: NORMAL
CLARITY, POC: CLEAR
COLOR, POC: YELLOW
GLUCOSE URINE, POC: NORMAL
KETONES, POC: NORMAL
LEUKOCYTE EST, POC: NORMAL
NITRITE, POC: NORMAL
PH, POC: 6.5
PROTEIN, POC: NORMAL
SPECIFIC GRAVITY, POC: 1.02
UROBILINOGEN, POC: 0.2

## 2022-04-11 PROCEDURE — 81002 URINALYSIS NONAUTO W/O SCOPE: CPT | Performed by: NURSE PRACTITIONER

## 2022-04-11 PROCEDURE — 99214 OFFICE O/P EST MOD 30 MIN: CPT | Performed by: NURSE PRACTITIONER

## 2022-04-11 ASSESSMENT — ENCOUNTER SYMPTOMS
BACK PAIN: 0
VOMITING: 0
ABDOMINAL DISTENTION: 0
ABDOMINAL PAIN: 0
NAUSEA: 0

## 2022-04-11 NOTE — PROGRESS NOTES
images which appears to only reveal only possible epididymitis. REVIEW OF SYSTEMS:  Review of Systems   Constitutional: Negative for chills and fever. Gastrointestinal: Negative for abdominal distention, abdominal pain, nausea and vomiting. Genitourinary: Positive for testicular pain. Negative for difficulty urinating, dysuria, flank pain, frequency, hematuria, penile pain, penile swelling, scrotal swelling and urgency. Groin pain, starts in left and moves into right- very severe   Musculoskeletal: Negative for back pain and gait problem. Psychiatric/Behavioral: Negative for agitation and confusion. PHYSICAL EXAM:  BP (!) 169/99   Pulse 109   Temp 97.9 °F (36.6 °C)   Ht 6' (1.829 m)   Wt 225 lb (102.1 kg)   SpO2 98%   BMI 30.52 kg/m²   Physical Exam  Vitals and nursing note reviewed. Constitutional:       General: He is not in acute distress. Appearance: Normal appearance. He is not ill-appearing. Pulmonary:      Effort: Pulmonary effort is normal. No respiratory distress. Abdominal:      General: There is no distension. Tenderness: There is no abdominal tenderness. There is no right CVA tenderness or left CVA tenderness. Genitourinary:     Testes:         Right: Tenderness present. Mass, swelling or testicular hydrocele not present. Left: Tenderness present. Mass, swelling or testicular hydrocele not present. Epididymis:      Right: Tenderness present. Left: Tenderness present. Comments: Very tender to palpation  Neurological:      Mental Status: He is alert and oriented to person, place, and time. Mental status is at baseline.    Psychiatric:         Mood and Affect: Mood normal.         Behavior: Behavior normal.         DATA:  Results for orders placed or performed in visit on 04/11/22   POCT Urinalysis no Micro   Result Value Ref Range    Color, UA yellow     Clarity, UA clear     Glucose, UA POC neg     Bilirubin, UA neg     Ketones, UA neg Spec Grav, UA 1.020     Blood, UA POC neg     pH, UA 6.5     Protein, UA POC neg     Urobilinogen, UA 0.2     Leukocytes, UA neg     Nitrite, UA neg     Appearance, Fluid Clear Clear, Slightly Cloudy       ASSESSMENT/PLAN  1. Pain in both testicles  Nothing that I can find structurally wrong. Imaging clear. Failed treatment with antibiotics and anti-inflammatories. Did not wish to initiate treatment with rectal suppositories. Wishes to be treated by the same pain management clinic as his sister. Have placed this referral today. Our office staff did speak with their office directly and stated they would accept the referral.  - POCT Urinalysis no Micro  - External Referral To Pain Clinic    2. Bilateral groin pain  See above. - External Referral To Pain Clinic      Orders Placed This Encounter   Procedures    External Referral To Pain Clinic     Referral Priority:   Routine     Referral Type:   Eval and Treat     Referral Reason:   Specialty Services Required     Requested Specialty:   Pain Management     Number of Visits Requested:   1    POCT Urinalysis no Micro        Return if symptoms worsen or fail to improve. An electronic signature was used to authenticate this note. ELINA HEARD, GENE - CNP    All information inputted into the note by the MA to include chief complaint, past medical history, past surgical history, medications, allergies, social and family history and review of systems has been reviewed and updated as needed by me. EMR Dragon/transcription disclaimer: Much of this document is electronic transcription/translation of spoken language to printed text. The electronic translation of spoken language may be erroneous or, at times, nonsensical words or phrases may be inadvertently transcribed.  Although I have reviewed the document for such errors, some may still exist.

## 2022-04-12 ENCOUNTER — TELEPHONE (OUTPATIENT)
Dept: UROLOGY | Age: 42
End: 2022-04-12

## 2022-04-12 NOTE — TELEPHONE ENCOUNTER
HAVE TRIED TO REACH OFFICE TO CHECK STATUS OF REFERRAL AND TO SEE IF PT HAS BEEN SCHEDULED FOR APPT.  BOTH TIMES DIANA LEFT A V.MESSAGE WITH THE REFERRAL TEAM MEMBER AND HAVE ASKED THEM TO CALL US BACK AT THE OFFICE WITH THE STATUS OF EITHER OF THESE AND STILL NO CALL BACK AS OF 4/12/22

## 2022-08-18 ENCOUNTER — APPOINTMENT (OUTPATIENT)
Dept: ULTRASOUND IMAGING | Facility: HOSPITAL | Age: 42
End: 2022-08-18

## 2022-08-18 ENCOUNTER — HOSPITAL ENCOUNTER (EMERGENCY)
Facility: HOSPITAL | Age: 42
Discharge: HOME | End: 2022-08-18
Attending: EMERGENCY MEDICINE | Admitting: EMERGENCY MEDICINE

## 2022-08-18 VITALS
SYSTOLIC BLOOD PRESSURE: 144 MMHG | HEIGHT: 72 IN | RESPIRATION RATE: 18 BRPM | BODY MASS INDEX: 24.38 KG/M2 | HEART RATE: 81 BPM | TEMPERATURE: 98.4 F | DIASTOLIC BLOOD PRESSURE: 86 MMHG | OXYGEN SATURATION: 97 % | WEIGHT: 180 LBS

## 2022-08-18 DIAGNOSIS — N50.819 PAIN IN TESTICLE, UNSPECIFIED LATERALITY: ICD-10-CM

## 2022-08-18 DIAGNOSIS — N45.1 EPIDIDYMITIS: Primary | ICD-10-CM

## 2022-08-18 LAB
ALBUMIN SERPL-MCNC: 4.1 G/DL (ref 3.5–5.2)
ALBUMIN/GLOB SERPL: 1.4 G/DL
ALP SERPL-CCNC: 100 U/L (ref 39–117)
ALT SERPL W P-5'-P-CCNC: 15 U/L (ref 1–41)
ANION GAP SERPL CALCULATED.3IONS-SCNC: 7 MMOL/L (ref 5–15)
AST SERPL-CCNC: 24 U/L (ref 1–40)
BACTERIA UR QL AUTO: ABNORMAL /HPF
BASOPHILS # BLD AUTO: 0.07 10*3/MM3 (ref 0–0.2)
BASOPHILS NFR BLD AUTO: 0.9 % (ref 0–1.5)
BILIRUB SERPL-MCNC: 0.2 MG/DL (ref 0–1.2)
BILIRUB UR QL STRIP: NEGATIVE
BUN SERPL-MCNC: 11 MG/DL (ref 6–20)
BUN/CREAT SERPL: 12 (ref 7–25)
CALCIUM SPEC-SCNC: 9.6 MG/DL (ref 8.6–10.5)
CHLORIDE SERPL-SCNC: 106 MMOL/L (ref 98–107)
CLARITY UR: CLEAR
CO2 SERPL-SCNC: 27 MMOL/L (ref 22–29)
COLOR UR: YELLOW
CREAT SERPL-MCNC: 0.92 MG/DL (ref 0.76–1.27)
CRP SERPL-MCNC: 1.05 MG/DL (ref 0–0.5)
DEPRECATED RDW RBC AUTO: 42.2 FL (ref 37–54)
EGFRCR SERPLBLD CKD-EPI 2021: 105.8 ML/MIN/1.73
EOSINOPHIL # BLD AUTO: 0.23 10*3/MM3 (ref 0–0.4)
EOSINOPHIL NFR BLD AUTO: 3.1 % (ref 0.3–6.2)
ERYTHROCYTE [DISTWIDTH] IN BLOOD BY AUTOMATED COUNT: 12.3 % (ref 12.3–15.4)
GLOBULIN UR ELPH-MCNC: 3 GM/DL
GLUCOSE SERPL-MCNC: 102 MG/DL (ref 65–99)
GLUCOSE UR STRIP-MCNC: NEGATIVE MG/DL
HCT VFR BLD AUTO: 44.3 % (ref 37.5–51)
HGB BLD-MCNC: 14.8 G/DL (ref 13–17.7)
HGB UR QL STRIP.AUTO: NEGATIVE
HYALINE CASTS UR QL AUTO: ABNORMAL /LPF
IMM GRANULOCYTES # BLD AUTO: 0.02 10*3/MM3 (ref 0–0.05)
IMM GRANULOCYTES NFR BLD AUTO: 0.3 % (ref 0–0.5)
KETONES UR QL STRIP: NEGATIVE
LEUKOCYTE ESTERASE UR QL STRIP.AUTO: ABNORMAL
LYMPHOCYTES # BLD AUTO: 1.88 10*3/MM3 (ref 0.7–3.1)
LYMPHOCYTES NFR BLD AUTO: 24.9 % (ref 19.6–45.3)
MCH RBC QN AUTO: 30.9 PG (ref 26.6–33)
MCHC RBC AUTO-ENTMCNC: 33.4 G/DL (ref 31.5–35.7)
MCV RBC AUTO: 92.5 FL (ref 79–97)
MONOCYTES # BLD AUTO: 0.73 10*3/MM3 (ref 0.1–0.9)
MONOCYTES NFR BLD AUTO: 9.7 % (ref 5–12)
NEUTROPHILS NFR BLD AUTO: 4.61 10*3/MM3 (ref 1.7–7)
NEUTROPHILS NFR BLD AUTO: 61.1 % (ref 42.7–76)
NITRITE UR QL STRIP: NEGATIVE
NRBC BLD AUTO-RTO: 0 /100 WBC (ref 0–0.2)
PH UR STRIP.AUTO: 6.5 [PH] (ref 5–8)
PLATELET # BLD AUTO: 256 10*3/MM3 (ref 140–450)
PMV BLD AUTO: 10.4 FL (ref 6–12)
POTASSIUM SERPL-SCNC: 4.2 MMOL/L (ref 3.5–5.2)
PROCALCITONIN SERPL-MCNC: 0.05 NG/ML (ref 0–0.25)
PROT SERPL-MCNC: 7.1 G/DL (ref 6–8.5)
PROT UR QL STRIP: NEGATIVE
RBC # BLD AUTO: 4.79 10*6/MM3 (ref 4.14–5.8)
RBC # UR STRIP: ABNORMAL /HPF
REF LAB TEST METHOD: ABNORMAL
SODIUM SERPL-SCNC: 140 MMOL/L (ref 136–145)
SP GR UR STRIP: 1.02 (ref 1–1.03)
SQUAMOUS #/AREA URNS HPF: ABNORMAL /HPF
UROBILINOGEN UR QL STRIP: ABNORMAL
WBC # UR STRIP: ABNORMAL /HPF
WBC NRBC COR # BLD: 7.54 10*3/MM3 (ref 3.4–10.8)

## 2022-08-18 PROCEDURE — 86140 C-REACTIVE PROTEIN: CPT | Performed by: EMERGENCY MEDICINE

## 2022-08-18 PROCEDURE — 99283 EMERGENCY DEPT VISIT LOW MDM: CPT

## 2022-08-18 PROCEDURE — 81001 URINALYSIS AUTO W/SCOPE: CPT | Performed by: EMERGENCY MEDICINE

## 2022-08-18 PROCEDURE — 76870 US EXAM SCROTUM: CPT

## 2022-08-18 PROCEDURE — 87086 URINE CULTURE/COLONY COUNT: CPT | Performed by: EMERGENCY MEDICINE

## 2022-08-18 PROCEDURE — 84145 PROCALCITONIN (PCT): CPT | Performed by: EMERGENCY MEDICINE

## 2022-08-18 PROCEDURE — 0 HYDROMORPHONE 1 MG/ML SOLUTION: Performed by: EMERGENCY MEDICINE

## 2022-08-18 PROCEDURE — 96374 THER/PROPH/DIAG INJ IV PUSH: CPT

## 2022-08-18 PROCEDURE — 96375 TX/PRO/DX INJ NEW DRUG ADDON: CPT

## 2022-08-18 PROCEDURE — 80053 COMPREHEN METABOLIC PANEL: CPT | Performed by: EMERGENCY MEDICINE

## 2022-08-18 PROCEDURE — 85025 COMPLETE CBC W/AUTO DIFF WBC: CPT | Performed by: EMERGENCY MEDICINE

## 2022-08-18 PROCEDURE — 25010000002 ONDANSETRON PER 1 MG: Performed by: EMERGENCY MEDICINE

## 2022-08-18 PROCEDURE — 93976 VASCULAR STUDY: CPT

## 2022-08-18 RX ORDER — DOXYCYCLINE 100 MG/1
100 CAPSULE ORAL 2 TIMES DAILY
Qty: 28 CAPSULE | Refills: 0 | Status: SHIPPED | OUTPATIENT
Start: 2022-08-18 | End: 2022-09-01

## 2022-08-18 RX ORDER — KETOROLAC TROMETHAMINE 10 MG/1
10 TABLET, FILM COATED ORAL EVERY 6 HOURS PRN
Qty: 10 TABLET | Refills: 0 | Status: SHIPPED | OUTPATIENT
Start: 2022-08-18 | End: 2022-08-23 | Stop reason: SDUPTHER

## 2022-08-18 RX ORDER — ACETAMINOPHEN AND CODEINE PHOSPHATE 300; 30 MG/1; MG/1
1 TABLET ORAL EVERY 6 HOURS PRN
Qty: 6 TABLET | Refills: 0 | Status: SHIPPED | OUTPATIENT
Start: 2022-08-18 | End: 2022-09-06

## 2022-08-18 RX ORDER — ONDANSETRON 2 MG/ML
4 INJECTION INTRAMUSCULAR; INTRAVENOUS ONCE
Status: COMPLETED | OUTPATIENT
Start: 2022-08-18 | End: 2022-08-18

## 2022-08-18 RX ORDER — KETOROLAC TROMETHAMINE 30 MG/ML
30 INJECTION, SOLUTION INTRAMUSCULAR; INTRAVENOUS ONCE
Status: DISCONTINUED | OUTPATIENT
Start: 2022-08-18 | End: 2022-08-18 | Stop reason: HOSPADM

## 2022-08-18 RX ADMIN — HYDROMORPHONE HYDROCHLORIDE 1 MG: 1 INJECTION, SOLUTION INTRAMUSCULAR; INTRAVENOUS; SUBCUTANEOUS at 12:53

## 2022-08-18 RX ADMIN — ONDANSETRON 4 MG: 2 INJECTION INTRAMUSCULAR; INTRAVENOUS at 12:54

## 2022-08-18 NOTE — DISCHARGE INSTRUCTIONS
Follow up with one of the [facility] physician groups below to setup primary care. If you have trouble making an appointment, please call the [facility] Nurse Line at [phone]    [facility] - [address]  [address] [phone]    [facility] - [address], [place], [place] [address] [phone]    [facility] - [address] [address], [address], [place] [address] [phone]     [facility] ([facility]) - [id number] [facility], [address], [place] [address] [phone]    [facility] - [address], [address] [phone]    [facility] - [address], [place] [address] [phone]    [facility] - [place]  [address], [place], [address] [phone]    [facility] - [place]  [address], [address] [phone]    [facility] - [address] [address] [phone]    [facility] - [place]  [address] [place] [address], [place], [address] [phone]        PEDIATRIC:    [facility] - [address] [address], [address], [place] [address] [phone]

## 2022-08-18 NOTE — ED NOTES
Pt refused d/c vitals.  Pt dressed in hallway and pulled out his IV.  Pt given d/c papers.  No questions per pt.

## 2022-08-18 NOTE — ED PROVIDER NOTES
Subjective   Patient is a 42-year-old gentleman who has got chronic testicular pain for the past 7 years recently had a hydrocele in the left testicle today he comes in for swelling and pain in the right testicle he is seen in the ED is here and at [facility] has been seeing urology at [facility] for the past many years multiple evaluation has been performed he was even referred to pain management for his chronic pain today he comes to the ER because of right testicular pain he wants answers for that.  He is also noticed some blood in the urine episodically with a testicular pain.  Denies any nausea vomiting denies any fever associate with this.  No dysuria urgency there is no recent history of STD as far as he knows.  I have tried explained to the gentleman the chronic pain in the testicle going on for the past 7 years episodically with difficult to certain and individually evaluated in the ED we will go ahead and run some tests on him to make sure there is nothing emergent going on but may not be able to give an answer as to what his pain is from for which he needs urological evaluation he wants to see urology over here because he thinks [facility] urology is not helping him.      Testicle Pain  Location:  Right testicular pain  Severity:  Moderate  Onset quality:  Gradual  Duration: The symptoms have been happening episodically for the past 7 years.  Timing:  Intermittent  Progression:  Waxing and waning  Chronicity:  Chronic  Associated symptoms: no abdominal pain, no chest pain, no congestion, no cough, no diarrhea, no fatigue, no fever, no headaches, no loss of consciousness, no myalgias, no nausea, no rash, no rhinorrhea, no shortness of breath, no sore throat, no vomiting and no wheezing    Blood in Urine  This is a chronic problem. The current episode started in the past 7 days. He describes the hematuria as gross hematuria. He is experiencing no pain. Associated symptoms include hematospermia. Pertinent  negatives include no abdominal pain, bladder pain, bone pain, chills, fever, flank pain, genital pain, inability to urinate, nausea, urinary retention or vomiting. There is no history of BPH, hypertension or prostatitis.       Review of Systems   Constitutional: Negative.  Negative for chills, fatigue and fever.   HENT: Negative.  Negative for congestion, rhinorrhea and sore throat.    Eyes: Negative.    Respiratory: Negative.  Negative for cough, chest tightness, shortness of breath, wheezing and stridor.    Cardiovascular: Negative.  Negative for chest pain.   Gastrointestinal: Negative.  Negative for abdominal distention, abdominal pain, diarrhea, nausea and vomiting.   Endocrine: Negative.    Genitourinary: Positive for hematuria and testicular pain. Negative for difficulty urinating and flank pain.   Musculoskeletal: Negative.  Negative for myalgias.   Skin: Negative.  Negative for color change and rash.   Neurological: Negative.  Negative for dizziness, loss of consciousness and headaches.   Hematological: Negative.    All other systems reviewed and are negative.      Past Medical History:   Diagnosis Date   • Patient denies medical problems        Allergies   Allergen Reactions   • Adhesive Tape Rash       Past Surgical History:   Procedure Laterality Date   • NO PAST SURGERIES         No family history on file.    Social History     Socioeconomic History   • Marital status:    Tobacco Use   • Smoking status: Current Every Day Smoker     Packs/day: 0.50   Substance and Sexual Activity   • Alcohol use: Not Currently   • Drug use: Not Currently           Objective   Physical Exam  Vitals and nursing note reviewed. Exam conducted with a chaperone present.   Constitutional:       General: He is not in acute distress.     Appearance: Normal appearance. He is not ill-appearing or diaphoretic.   HENT:      Head: Normocephalic.      Mouth/Throat:      Lips: No lesions.      Mouth: Mucous membranes are moist.    Eyes:      Conjunctiva/sclera: Conjunctivae normal.      Pupils: Pupils are equal, round, and reactive to light.   Cardiovascular:      Rate and Rhythm: Normal rate and regular rhythm.      Pulses: Normal pulses.      Heart sounds: Normal heart sounds.   Pulmonary:      Effort: Pulmonary effort is normal. No tachypnea or respiratory distress.      Breath sounds: Normal breath sounds and air entry.   Abdominal:      General: Abdomen is flat. Bowel sounds are normal. There is no distension or abdominal bruit.      Palpations: Abdomen is soft. There is no fluid wave, hepatomegaly, splenomegaly, mass or pulsatile mass.      Tenderness: There is no abdominal tenderness. There is no right CVA tenderness, left CVA tenderness, guarding or rebound.      Hernia: No hernia is present. There is no hernia in the left inguinal area or right inguinal area.   Genitourinary:     Pubic Area: No rash.       Penis: Normal and circumcised.       Testes:         Right: Tenderness and swelling present. Mass not present. Right testis is descended. Cremasteric reflex is present.          Left: Left testis is descended. Cremasteric reflex is present.       Epididymis:      Right: Not inflamed or enlarged. Tenderness present. No mass.   Musculoskeletal:      Cervical back: Normal range of motion.      Lumbar back: Normal.   Skin:     General: Skin is warm.      Capillary Refill: Capillary refill takes less than 2 seconds.      Coloration: Skin is not ashen or cyanotic.   Neurological:      General: No focal deficit present.      Mental Status: He is alert and oriented to person, place, and time.      Cranial Nerves: Cranial nerves are intact.      Motor: Motor function is intact.   Psychiatric:         Attention and Perception: Attention normal.         Mood and Affect: Mood normal.         Speech: Speech normal.         Behavior: Behavior normal.         Procedures           ED Course  ED Course as of 08/18/22 1610   u Aug 18, 2022    1605 Consider lab work-up discussed with him he is got recurrent epididymitis needs to follow-up with urology we will discharge him home with a follow-up. [TS]   1605 There is no evidence of any testicular torsion. [TS]   1605 Risks and benefits of treatments given and alternative treatment options discussed with patient/family. I answered all the questions in simple, plain language, and there was voiced understanding and agreement with plan of care. There were no further questions. Differential diagnosis discussed. Patient/family was advised that the practice of medicine is not always an exact science, and sometimes tests, physical exam, or history may not show the underlying conditions with certainty. Additionally, the condition may change or show itself later after initial presentation. There was also expressed understanding and agreement with this limitation of emergency medicine practice. Patient/family was asked to return to ED if any problem or issues or if condition worsens or does not improved. Patient/family agreed to follow up with PCP/specialist as advised, or return to ED if unable to see a provider in a timely fashion for continued symptoms.  [TS]   1610 Will need follow-up with urology [TS]      ED Course User Index  [TS] Trevon Joe MD                                   [facility] reviewed by Trevon Joe MD       MDM  Number of Diagnoses or Management Options  Diagnosis management comments: Patient with testicle pain chronic in nature for 7 years episodically.       Amount and/or Complexity of Data Reviewed  Clinical lab tests: ordered and reviewed  Tests in the radiology section of CPT®: ordered and reviewed  Tests in the medicine section of CPT®: reviewed and ordered    Risk of Complications, Morbidity, and/or Mortality  Presenting problems: low  Diagnostic procedures: low  Management options: low        Final diagnoses:   Epididymitis   Pain in testicle, unspecified laterality       ED  Disposition  ED Disposition     ED Disposition   Discharge    Condition   Stable    Comment   --             No follow-up provider specified.       Medication List      New Prescriptions    acetaminophen-codeine 300-30 MG per tablet  Commonly known as: TYLENOL #3  Take 1 tablet by mouth Every 6 (Six) Hours As Needed for Moderate Pain .     doxycycline 100 MG capsule  Commonly known as: MONODOX  Take 1 capsule by mouth 2 (Two) Times a Day for 14 days.     ketorolac 10 MG tablet  Commonly known as: TORADOL  Take 1 tablet by mouth Every 6 (Six) Hours As Needed for Moderate Pain .        Stop    naproxen 500 MG tablet  Commonly known as: NAPROSYN           Where to Get Your Medications      These medications were sent to [facility] [place] - [address] [place] [address] - [phone]  - [phone] [address] [facility] [address]    Phone: [phone]   · acetaminophen-codeine 300-30 MG per tablet  · doxycycline 100 MG capsule  · ketorolac 10 MG tablet          Trevon Joe MD  08/18/22 1611       Trevon Joe MD  08/18/22 1615

## 2022-08-19 LAB — BACTERIA SPEC AEROBE CULT: NO GROWTH

## 2022-08-23 ENCOUNTER — OFFICE VISIT (OUTPATIENT)
Dept: INTERNAL MEDICINE | Facility: CLINIC | Age: 42
End: 2022-08-23

## 2022-08-23 VITALS
WEIGHT: 227.6 LBS | BODY MASS INDEX: 30.83 KG/M2 | OXYGEN SATURATION: 100 % | HEART RATE: 88 BPM | SYSTOLIC BLOOD PRESSURE: 150 MMHG | HEIGHT: 72 IN | DIASTOLIC BLOOD PRESSURE: 100 MMHG | TEMPERATURE: 96.8 F

## 2022-08-23 DIAGNOSIS — N50.819 PERSISTENT TESTICULAR PAIN: ICD-10-CM

## 2022-08-23 DIAGNOSIS — I10 ESSENTIAL HYPERTENSION: ICD-10-CM

## 2022-08-23 DIAGNOSIS — Z11.59 NEED FOR HEPATITIS C SCREENING TEST: ICD-10-CM

## 2022-08-23 DIAGNOSIS — Z76.89 ENCOUNTER TO ESTABLISH CARE: Primary | ICD-10-CM

## 2022-08-23 DIAGNOSIS — N45.1 EPIDIDYMITIS, BILATERAL: ICD-10-CM

## 2022-08-23 PROCEDURE — 99214 OFFICE O/P EST MOD 30 MIN: CPT

## 2022-08-23 RX ORDER — KETOROLAC TROMETHAMINE 10 MG/1
10 TABLET, FILM COATED ORAL EVERY 6 HOURS PRN
Qty: 10 TABLET | Refills: 0 | Status: SHIPPED | OUTPATIENT
Start: 2022-08-23 | End: 2022-08-29

## 2022-08-23 NOTE — PROGRESS NOTES
Subjective   Chico Major is a 42 y.o. male.   Chief Complaint   Patient presents with   • Establish Care     Swelling/pain on and off on both testicles x 7 years, last 3 mos constant pain/swelling nonstop, can't urinate, was in the ER for this 8/18/22 for Epididymitis       History of Present Illness   Chico is here today to establish care after visiting the ER on August 18 for epididymitis.  He apparently had chronic testicular pain for the past 7 years and then had a recent hydrocele he to the left testicle, went to the ER on the 18th for swelling and pain in the right testicle.  Has been seeing urology at [facility] for many years.  Has also apparently been referred to pain management for his chronic pain.  He has also apparently noted some blood in his urine intermittently along with the testicular pain.  Based on the ED note, he is wanting to see urology here at [facility] because he does not feel that he got any help from urologist at [facility].  There is no evidence of any testicular torsion, he was sent home with Tylenol 3, doxycycline for 7 days, and oral Toradol.  He was advised to stop the naproxen.    He states that he has been taking his pain medication as prescribed as well as antibiotic and has noted improvement in the swelling however the pain is still very present.  Notes that it is a 8 out of 10.  States that with his chronic history it is typically present on the left side but currently is most significant on the right, but now feels that it is spreading back to the left again.  He states that its been severe for the past 3 months and he has been in and out of the ER and he just wants some answers.  States that he has been tested for STDs before and it was negative.  He denies being sexually active.   He states that it is difficult for him to urinate on command and when he does urinate he urinates only a little bit.  Reports that prior to going the ER he noted blood and blood clots in  his urine.  He denies any fever, shortness of breath, chest pain, cough, abdominal pain.  Reports that he has had diarrhea since starting the doxycycline.  He states that he is having difficulty explaining to his  that it is hard for him to urinate on command and states that he often gets frustrated with him and is requesting a letter that states that he has a medical condition that causes this.  He states that he does smoke half pack a day, rarely drinks alcohol, denies any illicit drug use, reports that he has smoked marijuana in the past but not recently.  He denies a family history of reproductive organ cancer, states that his dad and uncle both had pancreatic cancer.  Reports his dad also had a heart attack that he recovered from.   He denies any other acute concerns today.  He is compliant with getting baseline lab work done, is not fasting today as he had a Mountain Dew but will return on Friday.    The following portions of the patient's history were reviewed and updated as appropriate: allergies, current medications, past family history, past medical history, past social history, past surgical history and problem list.    Review of Systems    Objective   Past Medical History:   Diagnosis Date   • Epididymitis    • Hypertension    • Patient denies medical problems       Past Surgical History:   Procedure Laterality Date   • NO PAST SURGERIES          Current Outpatient Medications:   •  acetaminophen-codeine (TYLENOL #3) 300-30 MG per tablet, Take 1 tablet by mouth Every 6 (Six) Hours As Needed for Moderate Pain ., Disp: 6 tablet, Rfl: 0  •  doxycycline (MONODOX) 100 MG capsule, Take 1 capsule by mouth 2 (Two) Times a Day for 14 days., Disp: 28 capsule, Rfl: 0  •  ketorolac (TORADOL) 10 MG tablet, Take 1 tablet by mouth Every 6 (Six) Hours As Needed for Moderate Pain ., Disp: 10 tablet, Rfl: 0  •  losartan (Cozaar) 50 MG tablet, Take 1 tablet by mouth Daily., Disp: 30 tablet, Rfl: 1  •   "lamoTRIgine (LaMICtal) 100 MG tablet, Take 1 tablet by mouth Daily., Disp: 30 tablet, Rfl: 1      /100   Pulse 88   Temp 96.8 °F (36 °C)   Ht 182.9 cm (72\")   Wt 103 kg (227 lb 9.6 oz)   SpO2 100%   BMI 30.87 kg/m²      Body mass index is 30.87 kg/m².  BMI is within normal parameters. No other follow-up for BMI required.       Physical Exam  Vitals and nursing note reviewed.   Constitutional:       General: He is in acute distress.      Appearance: Normal appearance. He is not ill-appearing, toxic-appearing or diaphoretic.   HENT:      Head: Normocephalic and atraumatic.      Right Ear: Tympanic membrane, ear canal and external ear normal. There is no impacted cerumen.      Left Ear: Tympanic membrane, ear canal and external ear normal. There is no impacted cerumen.      Nose: Nose normal. No congestion or rhinorrhea.      Mouth/Throat:      Mouth: Mucous membranes are moist.      Dentition: Dental caries present.      Pharynx: Oropharynx is clear. No oropharyngeal exudate or posterior oropharyngeal erythema.   Eyes:      General:         Right eye: No discharge.         Left eye: No discharge.      Extraocular Movements: Extraocular movements intact.      Conjunctiva/sclera: Conjunctivae normal.      Pupils: Pupils are equal, round, and reactive to light.   Neck:      Vascular: No carotid bruit.   Cardiovascular:      Rate and Rhythm: Normal rate and regular rhythm.      Pulses: Normal pulses.      Heart sounds: Normal heart sounds.   Pulmonary:      Effort: Pulmonary effort is normal.      Breath sounds: Normal breath sounds.   Abdominal:      General: Bowel sounds are normal. There is no distension.      Palpations: Abdomen is soft. There is no mass.      Tenderness: There is abdominal tenderness (right lower and left lower quadrants in pelvic region). There is guarding. There is no rebound.      Hernia: No hernia is present.   Musculoskeletal:         General: Normal range of motion.      Cervical " back: Normal range of motion and neck supple. No rigidity or tenderness.      Right lower leg: No edema.      Left lower leg: No edema.   Skin:     General: Skin is warm and dry.      Capillary Refill: Capillary refill takes less than 2 seconds.   Neurological:      General: No focal deficit present.      Mental Status: He is alert and oriented to person, place, and time. Mental status is at baseline.   Psychiatric:         Attention and Perception: Attention and perception normal.         Mood and Affect: Mood is anxious.         Speech: Speech is rapid and pressured.         Behavior: Behavior is hyperactive.         Cognition and Memory: Cognition normal.         Judgment: Judgment normal.               Assessment & Plan   Diagnoses and all orders for this visit:    1. Encounter to establish care (Primary)  -     Comprehensive Metabolic Panel; Future  -     CBC & Differential; Future  -     Lipid Panel; Future  -     Uric Acid; Future  -     TSH; Future  -     Urinalysis With Microscopic - Urine, Clean Catch; Future    2. Essential hypertension  -     Comprehensive Metabolic Panel; Future  -     CBC & Differential; Future  -     Lipid Panel; Future  -     Uric Acid; Future  -     TSH; Future  -     Urinalysis With Microscopic - Urine, Clean Catch; Future    3. Need for hepatitis C screening test  -     Hepatitis C Antibody; Future    4. Epididymitis, bilateral  -     Ambulatory Referral to Urology    5. Persistent testicular pain  -     Ambulatory Referral to Urology  -     ketorolac (TORADOL) 10 MG tablet; Take 1 tablet by mouth Every 6 (Six) Hours As Needed for Moderate Pain .  Dispense: 10 tablet; Refill: 0               Plan of care reviewed with Chico Saenz continuing to take the antibiotic until complete, continue with Toradol every 6 hours, refill given.  We will have him return on Friday to repeat urine will send for culture.  And we will also go ahead and get baseline lab work.  We will communicate  results of lab work once available. I advised him to be fasting.  I have encouraged him to drink only water and to avoid drinks with caffeine or carbonation.  Will refer to urology given the chronic issue that this is.  Imaging from ER visit on August 18 reviewed, ultrasound of scrotum notes a thickened appearance of the right epididymis consistent with epididymitis, also noted tiny epididymal head cyst or pneumatocele on the left testicle measuring up to 5 mm.  It was negative for testicular torsion.  Urinalysis revealed trace leukocytes, CRP was slightly elevated and Pro-John was negative.  CMP revealed stable renal and liver function, CBC was unremarkable.  Blood pressure is elevated at 150/100 today, after discussing this with him he states he has not been taking his losartan consistently, I did encourage him to take this daily and we will reevaluate in 2 weeks.  I told him that at that time he will be off of the antibiotics and we can consider doing a STD panel if symptoms are still present.

## 2022-08-30 ENCOUNTER — OFFICE VISIT (OUTPATIENT)
Dept: UROLOGY | Facility: CLINIC | Age: 42
End: 2022-08-30

## 2022-08-30 VITALS — WEIGHT: 219.2 LBS | BODY MASS INDEX: 29.69 KG/M2 | HEIGHT: 72 IN

## 2022-08-30 DIAGNOSIS — R31.0 HEMATURIA, GROSS: ICD-10-CM

## 2022-08-30 DIAGNOSIS — R39.14 FEELING OF INCOMPLETE BLADDER EMPTYING: ICD-10-CM

## 2022-08-30 DIAGNOSIS — N50.82 SCROTAL PAIN: ICD-10-CM

## 2022-08-30 DIAGNOSIS — N45.1 EPIDIDYMITIS: Primary | ICD-10-CM

## 2022-08-30 PROCEDURE — 99214 OFFICE O/P EST MOD 30 MIN: CPT

## 2022-08-30 RX ORDER — TAMSULOSIN HYDROCHLORIDE 0.4 MG/1
1 CAPSULE ORAL NIGHTLY
Qty: 30 CAPSULE | Refills: 11 | Status: SHIPPED | OUTPATIENT
Start: 2022-08-30 | End: 2023-08-25

## 2022-08-30 RX ORDER — CIPROFLOXACIN 500 MG/1
500 TABLET, FILM COATED ORAL 2 TIMES DAILY
Qty: 56 TABLET | Refills: 0 | Status: SHIPPED | OUTPATIENT
Start: 2022-08-30 | End: 2022-09-06

## 2022-08-30 NOTE — PROGRESS NOTES
Subjective    Mr. Major is 42 y.o. male    Chief Complaint: Epididymitis, Right Persistent testicular pain    History of Present Illness     42-year-old male new patient referred for new problem of right testicular pain that started approximately 2 weeks ago.  Patient reports an extensive history of chronic left testicular pain for which she has been followed by [facility] urology however patient states that the exact day that the right scrotal pain started was the day that the left scrotal pain improved.  Patient reports he had been out with the tiller in the yard and states he felt a pop that was followed by what looked like semen from his penis and the pain was instantly improved on the left scrotal area.  Patient states a couple hours later the right scrotal area started to have excruciating pains.  Patient was seen in the emergency room on 8/18/2022 at which time patient underwent a scrotal ultrasound which revealed hypervascularity suspecting right epididymitis.  Patient was sent home on a 14-day course of doxycycline as patient has previously been prescribed doxycycline multiple times based on ER records over the past year given patient's left sided pain as well.  Patient reports that the doxycycline improves symptoms for a couple days and then pain returns.  Patient reports at this time he is still on the course of doxycycline and has approximately 3 days remaining however patient pain has not improved at all.  During the entire office visit patient was frequently changing positions due to discomfort.  On assessment of the scrotal area patient's right epididymal area appears inflamed and extremely tender.  With very slight swelling of the scrotal contents.    Patient reports approximately 1 month ago before the right scrotal pain was even present patient experienced a 2-day period of gross hematuria with blood clots as well as reported bloody semen.  Patient has recently underwent CT of the abdomen pelvis with  IV contrast at [facility] March 14, 2022 which revealed unremarkable no bladder abnormality visualized or solid renal masses.  No kidney stones.  Urinalysis and microscopy today is no normal no signs of infection or blood in the urine.  Patient is a current daily smoker.  No family history of bladder cancer.    Patient reports at times he is experiencing urine hesitancy and feelings of incomplete emptying.    The following portions of the patient's history were reviewed and updated as appropriate: allergies, current medications, past family history, past medical history, past social history, past surgical history and problem list.    Review of Systems   Constitutional: Negative for chills, fatigue and fever.   Gastrointestinal: Negative for nausea and vomiting.   Genitourinary: Positive for difficulty urinating, hematuria, scrotal swelling, testicular pain and urgency. Negative for decreased urine volume, dysuria, frequency, penile discharge, penile pain and penile swelling.         Current Outpatient Medications:   •  doxycycline (MONODOX) 100 MG capsule, Take 1 capsule by mouth 2 (Two) Times a Day for 14 days., Disp: 28 capsule, Rfl: 0  •  lamoTRIgine (LaMICtal) 100 MG tablet, Take 1 tablet by mouth Daily., Disp: 30 tablet, Rfl: 1  •  losartan (Cozaar) 50 MG tablet, Take 1 tablet by mouth Daily., Disp: 30 tablet, Rfl: 1  •  acetaminophen-codeine (TYLENOL #3) 300-30 MG per tablet, Take 1 tablet by mouth Every 6 (Six) Hours As Needed for Moderate Pain ., Disp: 6 tablet, Rfl: 0  •  ciprofloxacin (CIPRO) 500 MG tablet, Take 1 tablet by mouth 2 (Two) Times a Day., Disp: 56 tablet, Rfl: 0  •  tamsulosin (FLOMAX) 0.4 MG capsule 24 hr capsule, Take 1 capsule by mouth Every Night for 360 days., Disp: 30 capsule, Rfl: 11    Past Medical History:   Diagnosis Date   • Epididymitis    • Hypertension    • Patient denies medical problems        Past Surgical History:   Procedure Laterality Date   • NO PAST SURGERIES         Social  "History     Socioeconomic History   • Marital status:    Tobacco Use   • Smoking status: Current Every Day Smoker     Packs/day: 0.50   • Smokeless tobacco: Never Used   Vaping Use   • Vaping Use: Never used   Substance and Sexual Activity   • Alcohol use: Not Currently     Comment: Ocassionally   • Drug use: Not Currently   • Sexual activity: Not Currently       Family History   Problem Relation Age of Onset   • Cancer Father        Objective    Ht 182.9 cm (72\")   Wt 99.4 kg (219 lb 3.2 oz)   BMI 29.73 kg/m²     Physical Exam  Constitutional:       Appearance: Normal appearance.   Abdominal:      Tenderness: There is no right CVA tenderness or left CVA tenderness.      Hernia: There is no hernia in the left inguinal area or right inguinal area.   Genitourinary:     Penis: Normal.       Testes:         Right: Testicular hydrocele present. Varicocele not present.      Epididymis:      Right: Inflamed. Tenderness present.      Left: No tenderness.   Skin:     General: Skin is warm and dry.   Neurological:      Mental Status: He is alert and oriented to person, place, and time.   Psychiatric:         Mood and Affect: Mood normal.         Behavior: Behavior normal.             Results for orders placed or performed in visit on 08/26/22   Urine Culture - ,    UR   Result Value Ref Range    Urine Culture Final report     Result 1 No growth    Comprehensive Metabolic Panel    Specimen: Blood   Result Value Ref Range    Glucose 102 (H) 65 - 99 mg/dL    BUN 22 (H) 6 - 20 mg/dL    Creatinine 1.28 (H) 0.76 - 1.27 mg/dL    EGFR Result 71.2 >60.0 mL/min/1.73    BUN/Creatinine Ratio 17.2 7.0 - 25.0    Sodium 140 136 - 145 mmol/L    Potassium 4.6 3.5 - 5.2 mmol/L    Chloride 106 98 - 107 mmol/L    Total CO2 22.9 22.0 - 29.0 mmol/L    Calcium 9.8 8.6 - 10.5 mg/dL    Total Protein 6.7 6.0 - 8.5 g/dL    Albumin 4.50 3.50 - 5.20 g/dL    Globulin 2.2 gm/dL    A/G Ratio 2.0 g/dL    Total Bilirubin 0.2 0.0 - 1.2 mg/dL    " Alkaline Phosphatase 98 39 - 117 U/L    AST (SGOT) 33 1 - 40 U/L    ALT (SGPT) 27 1 - 41 U/L   Lipid Panel    Specimen: Blood   Result Value Ref Range    Total Cholesterol 227 (H) 0 - 200 mg/dL    Triglycerides 96 0 - 150 mg/dL    HDL Cholesterol 49 40 - 60 mg/dL    VLDL Cholesterol John 17 5 - 40 mg/dL    LDL Chol Calc (NIH) 161 (H) 0 - 100 mg/dL   Uric Acid    Specimen: Blood   Result Value Ref Range    Uric Acid 7.5 (H) 3.4 - 7.0 mg/dL   TSH    Specimen: Blood   Result Value Ref Range    TSH 2.150 0.270 - 4.200 uIU/mL   Hepatitis C Antibody    Specimen: Blood   Result Value Ref Range    Hep C Virus Ab <0.1 0.0 - 0.9 s/co ratio   Microscopic Examination -   Result Value Ref Range    WBC, UA 6-12 (A) /HPF    RBC, UA 0-2 /HPF    Epithelial Cells (non renal) 0-2 /HPF    Cast Type Comment     Bacteria, UA Comment None Seen /HPF   CBC & Differential    Specimen: Blood   Result Value Ref Range    WBC 10.45 3.40 - 10.80 10*3/mm3    RBC 4.89 4.14 - 5.80 10*6/mm3    Hemoglobin 14.9 13.0 - 17.7 g/dL    Hematocrit 43.9 37.5 - 51.0 %    MCV 89.8 79.0 - 97.0 fL    MCH 30.5 26.6 - 33.0 pg    MCHC 33.9 31.5 - 35.7 g/dL    RDW 12.6 12.3 - 15.4 %    Platelets 322 140 - 450 10*3/mm3    Neutrophil Rel % 64.6 42.7 - 76.0 %    Lymphocyte Rel % 22.6 19.6 - 45.3 %    Monocyte Rel % 8.7 5.0 - 12.0 %    Eosinophil Rel % 3.0 0.3 - 6.2 %    Basophil Rel % 0.8 0.0 - 1.5 %    Neutrophils Absolute 6.76 1.70 - 7.00 10*3/mm3    Lymphocytes Absolute 2.36 0.70 - 3.10 10*3/mm3    Monocytes Absolute 0.91 (H) 0.10 - 0.90 10*3/mm3    Eosinophils Absolute 0.31 0.00 - 0.40 10*3/mm3    Basophils Absolute 0.08 0.00 - 0.20 10*3/mm3    Immature Granulocyte Rel % 0.3 0.0 - 0.5 %    Immature Grans Absolute 0.03 0.00 - 0.05 10*3/mm3    nRBC 0.0 0.0 - 0.2 /100 WBC   Urinalysis With Microscopic - Urine, Clean Catch    Specimen: Urine, Clean Catch   Result Value Ref Range    Specific Gravity, UA Comment 1.005 - 1.030    pH, UA 5.5 5.0 - 8.0    Color, UA Yellow      Appearance, UA Clear Clear    Leukocytes, UA Negative Negative    Protein Trace (A) Negative    Glucose, UA Negative Negative    Ketones Negative Negative    Blood, UA Negative Negative    Bilirubin, UA Negative Negative    Urobilinogen, UA Comment     Nitrite, UA Negative Negative   US Scrotum & Testicles (08/18/2022 13:11)  CT ABDOMEN PELVIS W IV CONTRAST Additional Contrast? None (03/14/2022 20:31 EDT)  Independent review of CT scan of the abdomen/pelvis The patient has undergone a CT scan of the abdomen and pelvis With IV but without by mouth contrast. The images are available for me to review as an independent interpretation for evaluation and management.  Assessment of the renal parenchyma with regards to thickness, scarring, symmetry in appearance and function, presence of masses both pre-and postcontrast, and calcifications are noted.  The collecting system with regard to dilatation, presence of calcifications, and masses were reviewed.  The course and caliber the ureters also noted.  The renal vessels and retroperitoneum is inspected for pathology.  The solid viscera and bowel pattern are briefly reviewed, but will also be inspected by the radiologist. The renal pelvis is inspected.  This study shows kidneys and ureter unremarkable.  No renal or ureteral stones visualized.  No hydronephrosis.  No solid renal masses.  Patient's bladder appears empty and under distended so difficult to evaluate.    Assessment and Plan    Diagnoses and all orders for this visit:    1. Epididymitis (Primary)  -     ciprofloxacin (CIPRO) 500 MG tablet; Take 1 tablet by mouth 2 (Two) Times a Day.  Dispense: 56 tablet; Refill: 0    2. Scrotal pain  -     ciprofloxacin (CIPRO) 500 MG tablet; Take 1 tablet by mouth 2 (Two) Times a Day.  Dispense: 56 tablet; Refill: 0    3. Hematuria, gross    4. Feeling of incomplete bladder emptying  -     tamsulosin (FLOMAX) 0.4 MG capsule 24 hr capsule; Take 1 capsule by mouth Every Night for 360  days.  Dispense: 30 capsule; Refill: 11    42-year-old male new patient referred for new problem of right testicular pain that started approximately 2 weeks ago    Patient was previously prescribed doxycycline x14 days with no improvement in patient's pain.  Given patient's assessment findings and ongoing pain will have patient stop the doxycycline at this time and switch patient to a 28-day course of ciprofloxacin to see if any improvement in symptoms.    I will also start patient on alpha-blocker therapy as patient is reporting LUTS symptoms.    Given patient report of gross hematuria within the last month it is likely that patient was experiencing blood due to infection however given patient's tobacco use history and ongoing problems with urination and pain we will go ahead and schedule patient for a cystoscopy.  Patient was educated that the cystoscopy is not to evaluate the testicular area or pain that this is to evaluate the prostate and the bladder.

## 2022-09-06 ENCOUNTER — OFFICE VISIT (OUTPATIENT)
Dept: INTERNAL MEDICINE | Facility: CLINIC | Age: 42
End: 2022-09-06

## 2022-09-06 VITALS
OXYGEN SATURATION: 99 % | WEIGHT: 216.8 LBS | HEART RATE: 93 BPM | BODY MASS INDEX: 29.36 KG/M2 | HEIGHT: 72 IN | SYSTOLIC BLOOD PRESSURE: 138 MMHG | TEMPERATURE: 97.3 F | DIASTOLIC BLOOD PRESSURE: 80 MMHG

## 2022-09-06 DIAGNOSIS — N45.1 EPIDIDYMITIS: ICD-10-CM

## 2022-09-06 DIAGNOSIS — E78.41 ELEVATED LIPOPROTEIN(A): ICD-10-CM

## 2022-09-06 DIAGNOSIS — N45.1 EPIDIDYMITIS, BILATERAL: ICD-10-CM

## 2022-09-06 DIAGNOSIS — N28.9 RENAL DYSFUNCTION: ICD-10-CM

## 2022-09-06 DIAGNOSIS — I10 ESSENTIAL HYPERTENSION: Primary | ICD-10-CM

## 2022-09-06 DIAGNOSIS — R06.02 SHORTNESS OF BREATH: ICD-10-CM

## 2022-09-06 DIAGNOSIS — N50.82 SCROTAL PAIN: ICD-10-CM

## 2022-09-06 PROCEDURE — 99214 OFFICE O/P EST MOD 30 MIN: CPT

## 2022-09-06 PROCEDURE — 93000 ELECTROCARDIOGRAM COMPLETE: CPT

## 2022-09-06 RX ORDER — SACCHAROMYCES BOULARDII 250 MG
250 CAPSULE ORAL 2 TIMES DAILY
Qty: 60 CAPSULE | Refills: 0 | Status: SHIPPED | OUTPATIENT
Start: 2022-09-06 | End: 2022-10-06

## 2022-09-06 NOTE — PROGRESS NOTES
Subjective   Chico Major is a 42 y.o. male.   Chief Complaint   Patient presents with   • Testicle Pain     2 wk f/u, went to urologist & was instructed to stop toradol started abx & flomax (has been taking this for 6 days), still having urinary issues & pain       History of Present Illness   Chico presents here today for 2 week follow up on hypertension, renal impairment and epididymitis.   His BP at his initial visit here was 150/100, he is perscribed losartan 50 mg daily already however was not taking consistently.  He did have some labs performed and was noted to have some renal dysfucntion, was instructed to STOP oral Toradol.   He was seen by urology on 08/30/22, was prescribed Cipro (28 day course), had been on doxycycline, and was started on Flomax for feelings of incomplete bladder emptying.   He has a f/u appointment in October.  Reports that the Flomax is not working it, still is having issues urinating and emptying completely.  He states that the pain has improved is still present right-sided, but much improved.  He states that he has not been taking the Toradol as directed.  He reports that he has been more consistent with losartan intake, however has not had it yet today.  He denies any chest pain but states he does feel short of breath sometimes and he wonders if it is related to anxiety.  He reports that he is going to have a cystoscope performed after completion of Cipro.    The following portions of the patient's history were reviewed and updated as appropriate: allergies, current medications, past family history, past medical history, past social history, past surgical history and problem list.    Review of Systems    Objective   Past Medical History:   Diagnosis Date   • Epididymitis    • Hypertension    • Patient denies medical problems       Past Surgical History:   Procedure Laterality Date   • NO PAST SURGERIES          Current Outpatient Medications:   •  lamoTRIgine (LaMICtal) 100 MG  "tablet, Take 1 tablet by mouth Daily., Disp: 30 tablet, Rfl: 1  •  losartan (Cozaar) 50 MG tablet, Take 1 tablet by mouth Daily., Disp: 30 tablet, Rfl: 1  •  tamsulosin (FLOMAX) 0.4 MG capsule 24 hr capsule, Take 1 capsule by mouth Every Night for 360 days., Disp: 30 capsule, Rfl: 11  •  saccharomyces boulardii (Florastor) 250 MG capsule, Take 1 capsule by mouth 2 (Two) Times a Day for 30 days., Disp: 60 capsule, Rfl: 0      /80   Pulse 93   Temp 97.3 °F (36.3 °C)   Ht 182.9 cm (72\")   Wt 98.3 kg (216 lb 12.8 oz)   SpO2 99%   BMI 29.40 kg/m²      Body mass index is 29.4 kg/m².         Physical Exam  Vitals and nursing note reviewed.   Constitutional:       General: He is not in acute distress.     Appearance: Normal appearance. He is not ill-appearing, toxic-appearing or diaphoretic.   HENT:      Head: Normocephalic and atraumatic.   Eyes:      Pupils: Pupils are equal, round, and reactive to light.   Cardiovascular:      Rate and Rhythm: Normal rate and regular rhythm.      Pulses: Normal pulses.      Heart sounds: Normal heart sounds.   Pulmonary:      Effort: Pulmonary effort is normal.      Breath sounds: Normal breath sounds.   Abdominal:      General: Bowel sounds are normal. There is no distension.      Palpations: Abdomen is soft.      Tenderness: There is no abdominal tenderness.   Musculoskeletal:         General: Tenderness (right wrist) present. Normal range of motion.      Cervical back: Normal range of motion and neck supple.      Right lower leg: No edema.      Left lower leg: No edema.   Skin:     General: Skin is warm and dry.      Capillary Refill: Capillary refill takes less than 2 seconds.   Neurological:      General: No focal deficit present.      Mental Status: He is alert and oriented to person, place, and time. Mental status is at baseline.   Psychiatric:         Mood and Affect: Mood normal.         Behavior: Behavior normal.         Thought Content: Thought content normal.    " "     Judgment: Judgment normal.                 Assessment & Plan   Diagnoses and all orders for this visit:    1. Essential hypertension (Primary)  -     Comprehensive metabolic panel    2. Epididymitis, bilateral    3. Renal dysfunction  -     Comprehensive metabolic panel    4. Elevated lipoprotein(a)    5. Epididymitis    6. Scrotal pain    7. Shortness of breath    Other orders  -     saccharomyces boulardii (Florastor) 250 MG capsule; Take 1 capsule by mouth 2 (Two) Times a Day for 30 days.  Dispense: 60 capsule; Refill: 0  -     ECG 12 Lead            ECG 12 Lead    Date/Time: 9/6/2022 10:05 AM  Performed by: Marlena Bond APRN  Authorized by: Marlena Bond APRN   Comparison: compared with previous ECG from 8/13/2015  Similar to previous ECG  Rhythm: sinus rhythm  Rate: normal  Q waves: all    T elevation: V3, V4 and V5  Other: no other findings    Clinical impression: non-specific ECG             Plan of care reviewed with Chico   Blood pressure today is 138/80, reports that he has been more consistent with taking his losartan, has not had it yet today.  Continue with current management for now.  Based on his reports the Flomax is not making any improvements yet on his difficulty to urinate, however he has been on it now for over 1 week I advised I would give it another week as it can take 2 weeks to notice improvements.  There was some confusion on what medications he was on, after reevaluating he has been taking the ciprofloxacin however has only been taking it once daily, I reeducated him and stated that urology ordered it to be taken 500 mg 2 times daily.  He is to be on this for 28 days starting on August 30 and then will be getting a cystoscope after completion.  I advised that this antibiotic can increase risk for tendon rupture and tendinitis, he does note that his elbows wrists specifically right wrist and both knees have felt more \"tight \", on assessment today no abnormalities noted " on range of motion.  I have advised taking activity precautions and to please let us know if he does develop any acute pain.  We will do an EKG today to monitor QT, will evaluate a CMP to evaluate renal and liver function.  QT interval is 359, does show some peaked T waves, will assess labs as noted to be drawn today.  EKG is noted to be similar to EKG from 2015. Did advise going to E if he develops chest pain ad/or severe shortness of breath.  Did advise that being on antibiotics for such a long time can also increase risk of C. difficile infection, will start on a probiotic for 1 month.  Labs from prevously reviewed, he had elevated BUN and creatinine, was on Toradol for pain, this was stopped, will recheck today. Lipid panel revealed LDL of 161, advised on lifestyle modifications to help with improvement of this including changing diet to include more lean proteins, healthy fats, fruits and vegetables, and whole grains.  Avoid heavily processed foods, sugary foods, fried foods, and fast foods.  Would like to wait to start him on statin therapy until after completion of therapy he is currently on, we will reassess at his next visit.  Follow-up in 3 months for now, can be seen before this as needed

## 2022-09-07 LAB
ALBUMIN SERPL-MCNC: 4.4 G/DL (ref 3.5–5.2)
ALBUMIN/GLOB SERPL: 2.1 G/DL
ALP SERPL-CCNC: 91 U/L (ref 39–117)
ALT SERPL-CCNC: 12 U/L (ref 1–41)
AST SERPL-CCNC: 16 U/L (ref 1–40)
BILIRUB SERPL-MCNC: 0.4 MG/DL (ref 0–1.2)
BUN SERPL-MCNC: 13 MG/DL (ref 6–20)
BUN/CREAT SERPL: 11.6 (ref 7–25)
CALCIUM SERPL-MCNC: 9.3 MG/DL (ref 8.6–10.5)
CHLORIDE SERPL-SCNC: 107 MMOL/L (ref 98–107)
CO2 SERPL-SCNC: 25.1 MMOL/L (ref 22–29)
CREAT SERPL-MCNC: 1.12 MG/DL (ref 0.76–1.27)
EGFRCR-CYS SERPLBLD CKD-EPI 2021: 83.6 ML/MIN/1.73
GLOBULIN SER CALC-MCNC: 2.1 GM/DL
GLUCOSE SERPL-MCNC: 100 MG/DL (ref 65–99)
POTASSIUM SERPL-SCNC: 4.2 MMOL/L (ref 3.5–5.2)
PROT SERPL-MCNC: 6.5 G/DL (ref 6–8.5)
SODIUM SERPL-SCNC: 141 MMOL/L (ref 136–145)

## 2022-09-08 NOTE — PROGRESS NOTES
Renal and liver function appears WNL. Continue to take medications as prescribed and reach out with any further concerns or changes.

## 2022-12-09 ENCOUNTER — OFFICE VISIT (OUTPATIENT)
Dept: INTERNAL MEDICINE | Facility: CLINIC | Age: 42
End: 2022-12-09

## 2022-12-09 VITALS
HEART RATE: 108 BPM | BODY MASS INDEX: 29.39 KG/M2 | SYSTOLIC BLOOD PRESSURE: 130 MMHG | TEMPERATURE: 96.9 F | OXYGEN SATURATION: 98 % | HEIGHT: 72 IN | DIASTOLIC BLOOD PRESSURE: 98 MMHG | WEIGHT: 217 LBS

## 2022-12-09 DIAGNOSIS — I10 ESSENTIAL HYPERTENSION: Primary | ICD-10-CM

## 2022-12-09 DIAGNOSIS — N45.1 EPIDIDYMITIS, BILATERAL: ICD-10-CM

## 2022-12-09 LAB
BASOPHILS # BLD AUTO: 0.04 10*3/MM3 (ref 0–0.2)
BASOPHILS NFR BLD AUTO: 0.5 % (ref 0–1.5)
BILIRUB BLD-MCNC: NEGATIVE MG/DL
CLARITY, POC: CLEAR
COLOR UR: NORMAL
EOSINOPHIL # BLD AUTO: 0.2 10*3/MM3 (ref 0–0.4)
EOSINOPHIL NFR BLD AUTO: 2.7 % (ref 0.3–6.2)
ERYTHROCYTE [DISTWIDTH] IN BLOOD BY AUTOMATED COUNT: 12.6 % (ref 12.3–15.4)
GLUCOSE UR STRIP-MCNC: NEGATIVE MG/DL
HCT VFR BLD AUTO: 49.6 % (ref 37.5–51)
HGB BLD-MCNC: 15.9 G/DL (ref 13–17.7)
IMM GRANULOCYTES # BLD AUTO: 0.02 10*3/MM3 (ref 0–0.05)
IMM GRANULOCYTES NFR BLD AUTO: 0.3 % (ref 0–0.5)
KETONES UR QL: NEGATIVE
LEUKOCYTE EST, POC: NEGATIVE
LYMPHOCYTES # BLD AUTO: 1.71 10*3/MM3 (ref 0.7–3.1)
LYMPHOCYTES NFR BLD AUTO: 22.9 % (ref 19.6–45.3)
MCH RBC QN AUTO: 29.7 PG (ref 26.6–33)
MCHC RBC AUTO-ENTMCNC: 32.1 G/DL (ref 31.5–35.7)
MCV RBC AUTO: 92.7 FL (ref 79–97)
MONOCYTES # BLD AUTO: 0.58 10*3/MM3 (ref 0.1–0.9)
MONOCYTES NFR BLD AUTO: 7.8 % (ref 5–12)
NEUTROPHILS # BLD AUTO: 4.93 10*3/MM3 (ref 1.7–7)
NEUTROPHILS NFR BLD AUTO: 65.8 % (ref 42.7–76)
NITRITE UR-MCNC: NEGATIVE MG/ML
NRBC BLD AUTO-RTO: 0 /100 WBC (ref 0–0.2)
PH UR: 5.5 [PH] (ref 5–8)
PLATELET # BLD AUTO: 293 10*3/MM3 (ref 140–450)
PROT UR STRIP-MCNC: NEGATIVE MG/DL
RBC # BLD AUTO: 5.35 10*6/MM3 (ref 4.14–5.8)
RBC # UR STRIP: NEGATIVE /UL
SP GR UR: 1.03 (ref 1–1.03)
UROBILINOGEN UR QL: NORMAL
WBC # BLD AUTO: 7.48 10*3/MM3 (ref 3.4–10.8)

## 2022-12-09 PROCEDURE — 81003 URINALYSIS AUTO W/O SCOPE: CPT

## 2022-12-09 PROCEDURE — 99213 OFFICE O/P EST LOW 20 MIN: CPT

## 2022-12-09 RX ORDER — LOSARTAN POTASSIUM 100 MG/1
100 TABLET ORAL DAILY
Qty: 90 TABLET | Refills: 0 | Status: SHIPPED | OUTPATIENT
Start: 2022-12-09

## 2022-12-09 NOTE — PROGRESS NOTES
"Subjective   Chico Major is a 42 y.o. male.   Chief Complaint   Patient presents with   • Hypertension     3 mo f/u   • Testicle Pain     Testicular pain still present, states this is on and off       History of Present Illness   Mr. Major presents here today for a 3-month follow-up on hypertension.  He reports he does not consistently check his blood pressures at home but has been running around 147/108, he denies any chest pain, headaches, shortness of breath or activity intolerance.  He reports that since completing the Cipro for his epididymitis the testicular pain has returned, he states that the only time he does not note that he is in pain is when he is asleep.  He reports that he has been compliant with taking the Flomax, still feels that its hard to urinate and empty completely however is slightly improved from before he was on this medication.  He describes a \"white cloud \"from his penis every morning when he voids, he denies any penile discharge, denies any irritation surrounding the penis such as erythema itching or burning.  He reports that he missed his last 2 appointments with urology related to court dates that he could not miss, states that his sister has reached out to reschedule but has not heard back yet.  He denies any fevers, chills, denies any pain on urination, states that he actually feels relief when he urinates.  I believe it was urology's plans to perform a cystoscope once he completed the Cipro therapy.  The following portions of the patient's history were reviewed and updated as appropriate: allergies, current medications, past family history, past medical history, past social history, past surgical history and problem list.    Review of Systems    Objective   Past Medical History:   Diagnosis Date   • Epididymitis    • Hypertension    • Patient denies medical problems       Past Surgical History:   Procedure Laterality Date   • NO PAST SURGERIES          Current Outpatient " "Medications:   •  losartan (Cozaar) 100 MG tablet, Take 1 tablet by mouth Daily., Disp: 90 tablet, Rfl: 0  •  tamsulosin (FLOMAX) 0.4 MG capsule 24 hr capsule, Take 1 capsule by mouth Every Night for 360 days., Disp: 30 capsule, Rfl: 11  •  lamoTRIgine (LaMICtal) 100 MG tablet, Take 1 tablet by mouth Daily., Disp: 30 tablet, Rfl: 1      /98 (BP Location: Left arm, Patient Position: Sitting, Cuff Size: Adult)   Pulse 108   Temp 96.9 °F (36.1 °C) (Temporal)   Ht 182.9 cm (72\")   Wt 98.4 kg (217 lb)   SpO2 98%   BMI 29.43 kg/m²      Body mass index is 29.43 kg/m².    Physical Exam  Vitals and nursing note reviewed.   Constitutional:       Appearance: Normal appearance.   HENT:      Head: Normocephalic and atraumatic.   Eyes:      Pupils: Pupils are equal, round, and reactive to light.   Cardiovascular:      Rate and Rhythm: Normal rate and regular rhythm.      Pulses: Normal pulses.      Heart sounds: Normal heart sounds.   Pulmonary:      Effort: Pulmonary effort is normal.      Breath sounds: Normal breath sounds.   Abdominal:      General: Bowel sounds are normal.      Palpations: Abdomen is soft.   Musculoskeletal:         General: Normal range of motion.      Cervical back: Normal range of motion and neck supple.   Skin:     General: Skin is warm and dry.   Neurological:      General: No focal deficit present.      Mental Status: He is alert and oriented to person, place, and time. Mental status is at baseline.   Psychiatric:         Mood and Affect: Mood is anxious.         Behavior: Behavior normal.         Thought Content: Thought content normal.         Judgment: Judgment normal.               Assessment & Plan   Diagnoses and all orders for this visit:    1. Essential hypertension (Primary)  -     Basic metabolic panel  -     losartan (Cozaar) 100 MG tablet; Take 1 tablet by mouth Daily.  Dispense: 90 tablet; Refill: 0    2. Epididymitis, bilateral  -     CBC w AUTO Differential               Plan " of care reviewed with Mr. Major  His blood pressure today is 130/98, reports blood pressures as high as 147/108 at home, we will increase his losartan to 100 mg a day, we will obtain a BMP today, we will also go ahead and obtain a CBC related to ongoing issues with epididymitis to assess for leukocytosis, I have encouraged him to continue to call the urologist office to get appointment rescheduled.  We will also go ahead and assess a urine today, if indicated we will send for culture and treat only if abnormal growth is noted.  I have advised that I would like to see him back in 4 weeks to reevaluate hypertension, he states understanding of this.  He declines all vaccines today.

## 2022-12-10 LAB
BUN SERPL-MCNC: 15 MG/DL (ref 6–20)
BUN/CREAT SERPL: 14 (ref 7–25)
CALCIUM SERPL-MCNC: 9.9 MG/DL (ref 8.6–10.5)
CHLORIDE SERPL-SCNC: 107 MMOL/L (ref 98–107)
CO2 SERPL-SCNC: 24 MMOL/L (ref 22–29)
CREAT SERPL-MCNC: 1.07 MG/DL (ref 0.76–1.27)
EGFRCR SERPLBLD CKD-EPI 2021: 88.3 ML/MIN/1.73
GLUCOSE SERPL-MCNC: 95 MG/DL (ref 65–99)
POTASSIUM SERPL-SCNC: 4.6 MMOL/L (ref 3.5–5.2)
SODIUM SERPL-SCNC: 141 MMOL/L (ref 136–145)

## 2023-11-15 ENCOUNTER — HOSPITAL ENCOUNTER (EMERGENCY)
Facility: HOSPITAL | Age: 43
Discharge: HOME | End: 2023-11-15
Attending: FAMILY MEDICINE | Admitting: FAMILY MEDICINE
Payer: COMMERCIAL

## 2023-11-15 VITALS
DIASTOLIC BLOOD PRESSURE: 80 MMHG | BODY MASS INDEX: 31.83 KG/M2 | RESPIRATION RATE: 20 BRPM | HEART RATE: 78 BPM | HEIGHT: 72 IN | SYSTOLIC BLOOD PRESSURE: 168 MMHG | WEIGHT: 235 LBS | TEMPERATURE: 98 F | OXYGEN SATURATION: 99 %

## 2023-11-15 DIAGNOSIS — I16.0 HYPERTENSIVE URGENCY: ICD-10-CM

## 2023-11-15 DIAGNOSIS — K04.7 DENTAL ABSCESS: Primary | ICD-10-CM

## 2023-11-15 DIAGNOSIS — R07.89 ATYPICAL CHEST PAIN: ICD-10-CM

## 2023-11-15 DIAGNOSIS — I10 ESSENTIAL HYPERTENSION: ICD-10-CM

## 2023-11-15 LAB
ALBUMIN SERPL-MCNC: 3.8 G/DL (ref 3.5–5.2)
ALBUMIN/GLOB SERPL: 1.3 G/DL
ALP SERPL-CCNC: 88 U/L (ref 39–117)
ALT SERPL W P-5'-P-CCNC: 12 U/L (ref 1–41)
ANION GAP SERPL CALCULATED.3IONS-SCNC: 9 MMOL/L (ref 5–15)
AST SERPL-CCNC: 18 U/L (ref 1–40)
BASOPHILS # BLD AUTO: 0.05 10*3/MM3 (ref 0–0.2)
BASOPHILS NFR BLD AUTO: 0.4 % (ref 0–1.5)
BILIRUB SERPL-MCNC: 0.3 MG/DL (ref 0–1.2)
BUN SERPL-MCNC: 9 MG/DL (ref 6–20)
BUN/CREAT SERPL: 12.5 (ref 7–25)
CALCIUM SPEC-SCNC: 9 MG/DL (ref 8.6–10.5)
CHLORIDE SERPL-SCNC: 101 MMOL/L (ref 98–107)
CO2 SERPL-SCNC: 23 MMOL/L (ref 22–29)
CREAT SERPL-MCNC: 0.72 MG/DL (ref 0.76–1.27)
CRP SERPL-MCNC: 4.8 MG/DL (ref 0–0.5)
D-LACTATE SERPL-SCNC: 1.8 MMOL/L (ref 0.5–2)
DEPRECATED RDW RBC AUTO: 42.3 FL (ref 37–54)
EGFRCR SERPLBLD CKD-EPI 2021: 115.5 ML/MIN/1.73
EOSINOPHIL # BLD AUTO: 0.1 10*3/MM3 (ref 0–0.4)
EOSINOPHIL NFR BLD AUTO: 0.8 % (ref 0.3–6.2)
ERYTHROCYTE [DISTWIDTH] IN BLOOD BY AUTOMATED COUNT: 12.3 % (ref 12.3–15.4)
ERYTHROCYTE [SEDIMENTATION RATE] IN BLOOD: 41 MM/HR (ref 0–15)
GLOBULIN UR ELPH-MCNC: 3 GM/DL
GLUCOSE SERPL-MCNC: 134 MG/DL (ref 65–99)
HCT VFR BLD AUTO: 46.7 % (ref 37.5–51)
HGB BLD-MCNC: 14.9 G/DL (ref 13–17.7)
HOLD SPECIMEN: NORMAL
IMM GRANULOCYTES # BLD AUTO: 0.05 10*3/MM3 (ref 0–0.05)
IMM GRANULOCYTES NFR BLD AUTO: 0.4 % (ref 0–0.5)
LYMPHOCYTES # BLD AUTO: 1.1 10*3/MM3 (ref 0.7–3.1)
LYMPHOCYTES NFR BLD AUTO: 9.3 % (ref 19.6–45.3)
MCH RBC QN AUTO: 29.7 PG (ref 26.6–33)
MCHC RBC AUTO-ENTMCNC: 31.9 G/DL (ref 31.5–35.7)
MCV RBC AUTO: 93 FL (ref 79–97)
MONOCYTES # BLD AUTO: 1.12 10*3/MM3 (ref 0.1–0.9)
MONOCYTES NFR BLD AUTO: 9.5 % (ref 5–12)
NEUTROPHILS NFR BLD AUTO: 79.6 % (ref 42.7–76)
NEUTROPHILS NFR BLD AUTO: 9.39 10*3/MM3 (ref 1.7–7)
NRBC BLD AUTO-RTO: 0 /100 WBC (ref 0–0.2)
PLATELET # BLD AUTO: 239 10*3/MM3 (ref 140–450)
PMV BLD AUTO: 10.1 FL (ref 6–12)
POTASSIUM SERPL-SCNC: 3.9 MMOL/L (ref 3.5–5.2)
PROT SERPL-MCNC: 6.8 G/DL (ref 6–8.5)
RBC # BLD AUTO: 5.02 10*6/MM3 (ref 4.14–5.8)
SODIUM SERPL-SCNC: 133 MMOL/L (ref 136–145)
TROPONIN T SERPL HS-MCNC: <6 NG/L
WBC NRBC COR # BLD: 11.81 10*3/MM3 (ref 3.4–10.8)
WHOLE BLOOD HOLD COAG: NORMAL
WHOLE BLOOD HOLD SPECIMEN: NORMAL

## 2023-11-15 PROCEDURE — 36415 COLL VENOUS BLD VENIPUNCTURE: CPT

## 2023-11-15 PROCEDURE — 80053 COMPREHEN METABOLIC PANEL: CPT | Performed by: FAMILY MEDICINE

## 2023-11-15 PROCEDURE — 85025 COMPLETE CBC W/AUTO DIFF WBC: CPT | Performed by: FAMILY MEDICINE

## 2023-11-15 PROCEDURE — 86140 C-REACTIVE PROTEIN: CPT | Performed by: FAMILY MEDICINE

## 2023-11-15 PROCEDURE — 93005 ELECTROCARDIOGRAM TRACING: CPT

## 2023-11-15 PROCEDURE — 99283 EMERGENCY DEPT VISIT LOW MDM: CPT

## 2023-11-15 PROCEDURE — 96365 THER/PROPH/DIAG IV INF INIT: CPT

## 2023-11-15 PROCEDURE — 96375 TX/PRO/DX INJ NEW DRUG ADDON: CPT

## 2023-11-15 PROCEDURE — 83605 ASSAY OF LACTIC ACID: CPT | Performed by: FAMILY MEDICINE

## 2023-11-15 PROCEDURE — 25010000002 DEXAMETHASONE PER 1 MG: Performed by: FAMILY MEDICINE

## 2023-11-15 PROCEDURE — 25010000002 HYDRALAZINE PER 20 MG: Performed by: FAMILY MEDICINE

## 2023-11-15 PROCEDURE — 85652 RBC SED RATE AUTOMATED: CPT | Performed by: FAMILY MEDICINE

## 2023-11-15 PROCEDURE — 93005 ELECTROCARDIOGRAM TRACING: CPT | Performed by: FAMILY MEDICINE

## 2023-11-15 PROCEDURE — 25010000002 CLINDAMYCIN PER 300 MG: Performed by: FAMILY MEDICINE

## 2023-11-15 PROCEDURE — 25010000002 KETOROLAC TROMETHAMINE PER 15 MG: Performed by: FAMILY MEDICINE

## 2023-11-15 PROCEDURE — 93010 ELECTROCARDIOGRAM REPORT: CPT | Performed by: INTERNAL MEDICINE

## 2023-11-15 PROCEDURE — 84484 ASSAY OF TROPONIN QUANT: CPT | Performed by: FAMILY MEDICINE

## 2023-11-15 RX ORDER — LOSARTAN POTASSIUM 100 MG/1
100 TABLET ORAL DAILY
Qty: 90 TABLET | Refills: 0 | Status: SHIPPED | OUTPATIENT
Start: 2023-11-15

## 2023-11-15 RX ORDER — CLINDAMYCIN PHOSPHATE 600 MG/50ML
600 INJECTION, SOLUTION INTRAVENOUS ONCE
Status: COMPLETED | OUTPATIENT
Start: 2023-11-15 | End: 2023-11-15

## 2023-11-15 RX ORDER — HYDRALAZINE HYDROCHLORIDE 20 MG/ML
10 INJECTION INTRAMUSCULAR; INTRAVENOUS ONCE
Status: COMPLETED | OUTPATIENT
Start: 2023-11-15 | End: 2023-11-15

## 2023-11-15 RX ORDER — KETOROLAC TROMETHAMINE 30 MG/ML
30 INJECTION, SOLUTION INTRAMUSCULAR; INTRAVENOUS ONCE
Status: COMPLETED | OUTPATIENT
Start: 2023-11-15 | End: 2023-11-15

## 2023-11-15 RX ORDER — DEXAMETHASONE SODIUM PHOSPHATE 10 MG/ML
10 INJECTION INTRAMUSCULAR; INTRAVENOUS ONCE
Status: COMPLETED | OUTPATIENT
Start: 2023-11-15 | End: 2023-11-15

## 2023-11-15 RX ADMIN — KETOROLAC TROMETHAMINE 30 MG: 30 INJECTION, SOLUTION INTRAMUSCULAR; INTRAVENOUS at 13:00

## 2023-11-15 RX ADMIN — SODIUM CHLORIDE 600 MG: 9 INJECTION, SOLUTION INTRAVENOUS at 13:44

## 2023-11-15 RX ADMIN — HYDRALAZINE HYDROCHLORIDE 10 MG: 20 INJECTION INTRAMUSCULAR; INTRAVENOUS at 16:22

## 2023-11-15 RX ADMIN — DEXAMETHASONE SODIUM PHOSPHATE 10 MG: 10 INJECTION INTRAMUSCULAR; INTRAVENOUS at 13:01

## 2023-11-15 NOTE — ED PROVIDER NOTES
CHIEF COMPLAINT  Chief Complaint   Patient presents with    Chest Pain    Dental Pain     HPI  Chico Major is a 43 y.o. male who presents with multiple complaints.  Patient states that his left side of his face is starting to swell and causes pain from his dental abscess in the left upper canine/premolar area.  Patient states that he has not had a fever but he has a lot of pressure.  Patient states that he has an appointment with dental tomorrow.  But the pain became so bad today that it started going into his neck and his upper chest that is sharp in nature.  Patient denies any previous cardiac history.  No trauma.      EMS PRE-ARRIVAL TREATMENT:       REVIEW OF SYSTEMS  CONSTITUTIONAL:  No complaints of fever, chills,or weakness  EYES:  No complaints of discharge   ENT: No complaints of sore throat or ear pain  CARDIOVASCULAR:  No complaints of chest pain, palpitations, or swelling  RESPIRATORY:  No complaints of cough or shortness of breath  GI:  No complaints of abdominal pain, nausea, vomiting, or diarrhea  MUSCULOSKELETAL:  No complaints of back pain  SKIN:  No complaints of rash  NEUROLOGIC:  No complaints of headache, focal weakness, or sensory changes  ENDOCRINE:  No complaints of polyuria or polydipsia  LYMPHATIC:  No complaints of swollen glands  GENITOURINARY: No complaints of urinary frequency or hematuria      PAST MEDICAL HISTORY  Past Medical History:   Diagnosis Date    Epididymitis     Hypertension     Patient denies medical problems        FAMILY HISTORY  Family History   Problem Relation Age of Onset    Cancer Father        SOCIAL HISTORY  Social History     Socioeconomic History    Marital status:    Tobacco Use    Smoking status: Every Day     Packs/day: .5     Types: Cigarettes    Smokeless tobacco: Never   Vaping Use    Vaping Use: Never used   Substance and Sexual Activity    Alcohol use: Not Currently     Comment: Ocassionally    Drug use: Not Currently    Sexual activity: Not  "Currently       IMMUNIZATION HISTORY  Deferred to primary care physician.    SURGICAL HISTORY  Past Surgical History:   Procedure Laterality Date    NO PAST SURGERIES         CURRENT MEDICATIONS    Current Facility-Administered Medications:     hydrALAZINE (APRESOLINE) injection 10 mg, 10 mg, Intravenous, Once, Shawn Martin Jr., MD    Current Outpatient Medications:     losartan (Cozaar) 100 MG tablet, Take 1 tablet by mouth Daily., Disp: 90 tablet, Rfl: 0    lamoTRIgine (LaMICtal) 100 MG tablet, Take 1 tablet by mouth Daily., Disp: 30 tablet, Rfl: 1    ALLERGIES  Allergies   Allergen Reactions    Adhesive Tape Rash       PHYSICAL EXAM  VITAL SIGNS:   /98   Pulse 78   Temp 98.1 °F (36.7 °C) (Oral)   Resp 20   Ht 182.9 cm (72\")   Wt 107 kg (235 lb)   SpO2 98%   BMI 31.87 kg/m²     Constitutional: No pain noted worse in the left face and anterior chest    HEENT/DENTAL: Dental swelling in the left upper premolar area.  There is tenderness of the left cheek with swelling.  Cellulitis is noted    Eyes: PERRLA. EOMI. Conjunctiva normal. No discharge.     Neck: Supple. Normal range of motion. No tenderness. Trachea midline.    Cardiovascular: Normal heart rate. Normal rhythm. No murmurs. No rubs. No gallops.  Positive tender to palpation in left anterior chest wall    Thorax & Lungs: Equal bilateral breath sounds. No respiratory distress. No retractions. No wheezes. No rales. No rhonchi.     Skin: Warm. No erythema. No rash. Normal color.     Abdomen: Normal bowel sounds. Soft. No distention. No tenderness to palpation. No guarding. No rebound. No palpable or pulsatile masses.        RADIOLOGY/PROCEDURES        No orders to display          FUTURE APPOINTMENTS     No future appointments.         COURSE & MEDICAL DECISION MAKING       Patient's partial differential diagnosis can include: Panic attack, anxiety unstable angina angina, hypertensive chest pain, nausea    Patient's chest pain has resolved on " its own.  Patient's pain overall much improved in his face after IV clindamycin dexamethasone.  And Toradol.    Patient's level of risk: moderate        CRITICAL CARE    CRITICAL CARE: no    CRITICAL CARE TIME: none      The patient's last clinical visit to PCP in the [facility] electronic old medical record was reviewed by me:     Also Old charts were reviewed per [facility] EMR.  Pertinent details are summarized above.  All laboratory, radiologic, and EKG studies that were performed in the Emergency Department were a necessary part of the evaluation needed to exclude unstable or  emergent medical conditions:     Patient was hemodynamically and neurologically stable in the ED.   Pertinent studies were reviewed as above.     Recent Results (from the past 24 hour(s))   ECG 12 Lead Chest Pain    Collection Time: 11/15/23 11:54 AM   Result Value Ref Range    QT Interval 336 ms    QTC Interval 399 ms   CBC Auto Differential    Collection Time: 11/15/23  1:38 PM    Specimen: Blood   Result Value Ref Range    WBC 11.81 (H) 3.40 - 10.80 10*3/mm3    RBC 5.02 4.14 - 5.80 10*6/mm3    Hemoglobin 14.9 13.0 - 17.7 g/dL    Hematocrit 46.7 37.5 - 51.0 %    MCV 93.0 79.0 - 97.0 fL    MCH 29.7 26.6 - 33.0 pg    MCHC 31.9 31.5 - 35.7 g/dL    RDW 12.3 12.3 - 15.4 %    RDW-SD 42.3 37.0 - 54.0 fl    MPV 10.1 6.0 - 12.0 fL    Platelets 239 140 - 450 10*3/mm3    Neutrophil % 79.6 (H) 42.7 - 76.0 %    Lymphocyte % 9.3 (L) 19.6 - 45.3 %    Monocyte % 9.5 5.0 - 12.0 %    Eosinophil % 0.8 0.3 - 6.2 %    Basophil % 0.4 0.0 - 1.5 %    Immature Grans % 0.4 0.0 - 0.5 %    Neutrophils, Absolute 9.39 (H) 1.70 - 7.00 10*3/mm3    Lymphocytes, Absolute 1.10 0.70 - 3.10 10*3/mm3    Monocytes, Absolute 1.12 (H) 0.10 - 0.90 10*3/mm3    Eosinophils, Absolute 0.10 0.00 - 0.40 10*3/mm3    Basophils, Absolute 0.05 0.00 - 0.20 10*3/mm3    Immature Grans, Absolute 0.05 0.00 - 0.05 10*3/mm3    nRBC 0.0 0.0 - 0.2 /100 WBC   Green Top (Gel)    Collection Time: 11/15/23  1:38  PM   Result Value Ref Range    Extra Tube Hold for add-ons.    Lavender Top    Collection Time: 11/15/23  1:38 PM   Result Value Ref Range    Extra Tube hold for add-on    Red Top    Collection Time: 11/15/23  1:38 PM   Result Value Ref Range    Extra Tube Hold for add-ons.    Light Blue Top    Collection Time: 11/15/23  1:38 PM   Result Value Ref Range    Extra Tube Hold for add-ons.    Sedimentation Rate    Collection Time: 11/15/23  1:38 PM    Specimen: Blood   Result Value Ref Range    Sed Rate 41 (H) 0 - 15 mm/hr   [place] Blood Culture Bottle Set    Collection Time: 11/15/23  1:43 PM    Specimen: Hand, Right; Blood   Result Value Ref Range    Extra Tube Hold for add-ons.    Lactic Acid, Plasma    Collection Time: 11/15/23  1:43 PM    Specimen: Blood   Result Value Ref Range    Lactate 1.8 0.5 - 2.0 mmol/L   Comprehensive Metabolic Panel    Collection Time: 11/15/23  2:43 PM    Specimen: Blood   Result Value Ref Range    Glucose 134 (H) 65 - 99 mg/dL    BUN 9 6 - 20 mg/dL    Creatinine 0.72 (L) 0.76 - 1.27 mg/dL    Sodium 133 (L) 136 - 145 mmol/L    Potassium 3.9 3.5 - 5.2 mmol/L    Chloride 101 98 - 107 mmol/L    CO2 23.0 22.0 - 29.0 mmol/L    Calcium 9.0 8.6 - 10.5 mg/dL    Total Protein 6.8 6.0 - 8.5 g/dL    Albumin 3.8 3.5 - 5.2 g/dL    ALT (SGPT) 12 1 - 41 U/L    AST (SGOT) 18 1 - 40 U/L    Alkaline Phosphatase 88 39 - 117 U/L    Total Bilirubin 0.3 0.0 - 1.2 mg/dL    Globulin 3.0 gm/dL    A/G Ratio 1.3 g/dL    BUN/Creatinine Ratio 12.5 7.0 - 25.0    Anion Gap 9.0 5.0 - 15.0 mmol/L    eGFR 115.5 >60.0 mL/min/1.73   Single High Sensitivity Troponin T    Collection Time: 11/15/23  2:43 PM    Specimen: Blood   Result Value Ref Range    HS Troponin T <6 <22 ng/L   C-reactive Protein    Collection Time: 11/15/23  2:43 PM    Specimen: Blood   Result Value Ref Range    C-Reactive Protein 4.80 (H) 0.00 - 0.50 mg/dL       The patient received:  Medications   hydrALAZINE (APRESOLINE) injection 10 mg (has no  administration in time range)   dexAMETHasone (DECADRON) injection 10 mg (10 mg Intravenous Given 11/15/23 1301)   clindamycin (CLEOCIN) 600 mg in sodium chloride 0.9% 50 mL IVPB (premix) (0 mg Intravenous Stopped 11/15/23 1359)   ketorolac (TORADOL) injection 30 mg (30 mg Intravenous Given 11/15/23 1300)            Disposition: Discharged home      Dragon disclaimer:  Part of this note may be an electronic transcription/translation of spoken language to printed text using the Dragon Dictation System.     I have reviewed the patient’s prescription history via a prescription monitoring program.  This information is consistent with my knowledge of the patient’s controlled substance use history.    Patient evaluate during Coronavirus Pandemic. Isolation practices followed according to [facility] policy.    FINAL IMPRESSION   Diagnosis Plan   1. Dental abscess        2. Hypertensive urgency        3. Atypical chest pain        4. Essential hypertension  losartan (Cozaar) 100 MG tablet            MD Mario Lino Jr, Thomas Mark Jr., MD  11/15/23 7690

## 2023-11-17 LAB
QT INTERVAL: 336 MS
QTC INTERVAL: 399 MS

## 2023-11-25 NOTE — ED NOTES
ENCOURAGED PT TO TAKE HIS BP MEDICATIONS     Sonya Weeks, RN  09/20/21 0127    
Encouraged pt to give a u/a     Sonya Weeks, RN  09/19/21 4741    
WAITING U/S TO COME     Sonya Weeks, RN  09/19/21 7661    
Warm blanket given     Sonya Weeks, RN  09/19/21 1199    
Cannabis

## 2023-12-13 ENCOUNTER — HOSPITAL ENCOUNTER (EMERGENCY)
Age: 43
Discharge: HOME | End: 2023-12-13
Attending: EMERGENCY MEDICINE
Payer: MEDICAID

## 2023-12-13 ENCOUNTER — APPOINTMENT (OUTPATIENT)
Dept: CT IMAGING | Age: 43
End: 2023-12-13
Payer: MEDICAID

## 2023-12-13 ENCOUNTER — APPOINTMENT (OUTPATIENT)
Dept: ULTRASOUND IMAGING | Age: 43
End: 2023-12-13
Payer: MEDICAID

## 2023-12-13 VITALS
OXYGEN SATURATION: 100 % | TEMPERATURE: 97.7 F | BODY MASS INDEX: 24.38 KG/M2 | SYSTOLIC BLOOD PRESSURE: 164 MMHG | WEIGHT: 180 LBS | RESPIRATION RATE: 18 BRPM | HEIGHT: 72 IN | HEART RATE: 78 BPM | DIASTOLIC BLOOD PRESSURE: 99 MMHG

## 2023-12-13 DIAGNOSIS — F15.10 METHAMPHETAMINE ABUSE (HCC): ICD-10-CM

## 2023-12-13 DIAGNOSIS — R10.9 ABDOMINAL PAIN, UNSPECIFIED ABDOMINAL LOCATION: Primary | ICD-10-CM

## 2023-12-13 DIAGNOSIS — N50.811 PAIN IN BOTH TESTICLES: ICD-10-CM

## 2023-12-13 DIAGNOSIS — N50.812 PAIN IN BOTH TESTICLES: ICD-10-CM

## 2023-12-13 LAB
ALBUMIN SERPL-MCNC: 4.3 G/DL (ref 3.5–5.2)
ALP SERPL-CCNC: 108 U/L (ref 40–130)
ALT SERPL-CCNC: 20 U/L (ref 5–41)
AMPHET UR QL SCN: POSITIVE
ANION GAP SERPL CALCULATED.3IONS-SCNC: 10 MMOL/L (ref 7–19)
AST SERPL-CCNC: 26 U/L (ref 5–40)
BARBITURATES UR QL SCN: NEGATIVE
BASOPHILS # BLD: 0 K/UL (ref 0–0.2)
BASOPHILS NFR BLD: 0.4 % (ref 0–1)
BENZODIAZ UR QL SCN: NEGATIVE
BILIRUB SERPL-MCNC: <0.2 MG/DL (ref 0.2–1.2)
BILIRUB UR QL STRIP: NEGATIVE
BUN SERPL-MCNC: 14 MG/DL (ref 6–20)
BUPRENORPHINE URINE: NEGATIVE
CALCIUM SERPL-MCNC: 9.7 MG/DL (ref 8.6–10)
CANNABINOIDS UR QL SCN: POSITIVE
CHLORIDE SERPL-SCNC: 107 MMOL/L (ref 98–111)
CLARITY UR: CLEAR
CO2 SERPL-SCNC: 24 MMOL/L (ref 22–29)
COCAINE UR QL SCN: NEGATIVE
COLOR UR: YELLOW
CREAT SERPL-MCNC: 0.9 MG/DL (ref 0.5–1.2)
DRUG SCREEN COMMENT UR-IMP: ABNORMAL
EOSINOPHIL # BLD: 0.1 K/UL (ref 0–0.6)
EOSINOPHIL NFR BLD: 0.6 % (ref 0–5)
ERYTHROCYTE [DISTWIDTH] IN BLOOD BY AUTOMATED COUNT: 12.1 % (ref 11.5–14.5)
FENTANYL SCREEN, URINE: NEGATIVE
GLUCOSE SERPL-MCNC: 112 MG/DL (ref 74–109)
GLUCOSE UR STRIP.AUTO-MCNC: NEGATIVE MG/DL
HCT VFR BLD AUTO: 48.6 % (ref 42–52)
HGB BLD-MCNC: 15.8 G/DL (ref 14–18)
HGB UR STRIP.AUTO-MCNC: NEGATIVE MG/L
IMM GRANULOCYTES # BLD: 0 K/UL
KETONES UR STRIP.AUTO-MCNC: ABNORMAL MG/DL
LEUKOCYTE ESTERASE UR QL STRIP.AUTO: NEGATIVE
LIPASE SERPL-CCNC: 20 U/L (ref 13–60)
LYMPHOCYTES # BLD: 1.5 K/UL (ref 1.1–4.5)
LYMPHOCYTES NFR BLD: 14.4 % (ref 20–40)
MCH RBC QN AUTO: 30.6 PG (ref 27–31)
MCHC RBC AUTO-ENTMCNC: 32.5 G/DL (ref 33–37)
MCV RBC AUTO: 94 FL (ref 80–94)
METHADONE UR QL SCN: NEGATIVE
METHAMPHETAMINE, URINE: POSITIVE
MONOCYTES # BLD: 0.6 K/UL (ref 0–0.9)
MONOCYTES NFR BLD: 5.7 % (ref 0–10)
NEUTROPHILS # BLD: 8.2 K/UL (ref 1.5–7.5)
NEUTS SEG NFR BLD: 78.7 % (ref 50–65)
NITRITE UR QL STRIP.AUTO: NEGATIVE
OPIATES UR QL SCN: NEGATIVE
OXYCODONE UR QL SCN: NEGATIVE
PCP UR QL SCN: NEGATIVE
PH UR STRIP.AUTO: 6 [PH] (ref 5–8)
PLATELET # BLD AUTO: 248 K/UL (ref 130–400)
PMV BLD AUTO: 10.8 FL (ref 9.4–12.4)
POTASSIUM SERPL-SCNC: 4.4 MMOL/L (ref 3.5–5)
PROT SERPL-MCNC: 7.5 G/DL (ref 6.6–8.7)
PROT UR STRIP.AUTO-MCNC: NEGATIVE MG/DL
RBC # BLD AUTO: 5.17 M/UL (ref 4.7–6.1)
SODIUM SERPL-SCNC: 141 MMOL/L (ref 136–145)
SP GR UR STRIP.AUTO: 1.03 (ref 1–1.03)
TRICYCLIC, URINE: NEGATIVE
UROBILINOGEN UR STRIP.AUTO-MCNC: 0.2 E.U./DL
WBC # BLD AUTO: 10.4 K/UL (ref 4.8–10.8)

## 2023-12-13 PROCEDURE — 81003 URINALYSIS AUTO W/O SCOPE: CPT

## 2023-12-13 PROCEDURE — 6360000002 HC RX W HCPCS: Performed by: EMERGENCY MEDICINE

## 2023-12-13 PROCEDURE — 83690 ASSAY OF LIPASE: CPT

## 2023-12-13 PROCEDURE — 80053 COMPREHEN METABOLIC PANEL: CPT

## 2023-12-13 PROCEDURE — 96374 THER/PROPH/DIAG INJ IV PUSH: CPT

## 2023-12-13 PROCEDURE — 96375 TX/PRO/DX INJ NEW DRUG ADDON: CPT

## 2023-12-13 PROCEDURE — 6360000004 HC RX CONTRAST MEDICATION: Performed by: EMERGENCY MEDICINE

## 2023-12-13 PROCEDURE — 76870 US EXAM SCROTUM: CPT

## 2023-12-13 PROCEDURE — 85025 COMPLETE CBC W/AUTO DIFF WBC: CPT

## 2023-12-13 PROCEDURE — 80307 DRUG TEST PRSMV CHEM ANLYZR: CPT

## 2023-12-13 PROCEDURE — 36415 COLL VENOUS BLD VENIPUNCTURE: CPT

## 2023-12-13 PROCEDURE — 99285 EMERGENCY DEPT VISIT HI MDM: CPT

## 2023-12-13 PROCEDURE — 74177 CT ABD & PELVIS W/CONTRAST: CPT

## 2023-12-13 PROCEDURE — G0480 DRUG TEST DEF 1-7 CLASSES: HCPCS

## 2023-12-13 RX ORDER — KETOROLAC TROMETHAMINE 30 MG/ML
30 INJECTION, SOLUTION INTRAMUSCULAR; INTRAVENOUS ONCE
Status: DISCONTINUED | OUTPATIENT
Start: 2023-12-13 | End: 2023-12-13

## 2023-12-13 RX ORDER — LOSARTAN POTASSIUM 100 MG/1
100 TABLET ORAL DAILY
COMMUNITY

## 2023-12-13 RX ORDER — LORAZEPAM 2 MG/ML
1 INJECTION INTRAMUSCULAR ONCE
Status: COMPLETED | OUTPATIENT
Start: 2023-12-13 | End: 2023-12-13

## 2023-12-13 RX ORDER — KETOROLAC TROMETHAMINE 30 MG/ML
30 INJECTION, SOLUTION INTRAMUSCULAR; INTRAVENOUS ONCE
Status: COMPLETED | OUTPATIENT
Start: 2023-12-13 | End: 2023-12-13

## 2023-12-13 RX ADMIN — LORAZEPAM 1 MG: 2 INJECTION INTRAMUSCULAR; INTRAVENOUS at 10:48

## 2023-12-13 RX ADMIN — IOPAMIDOL 70 ML: 755 INJECTION, SOLUTION INTRAVENOUS at 09:30

## 2023-12-13 RX ADMIN — KETOROLAC TROMETHAMINE 30 MG: 30 INJECTION, SOLUTION INTRAMUSCULAR; INTRAVENOUS at 09:34

## 2023-12-13 ASSESSMENT — PAIN DESCRIPTION - LOCATION: LOCATION: ABDOMEN;OTHER (COMMENT)

## 2023-12-13 ASSESSMENT — PAIN SCALES - GENERAL: PAINLEVEL_OUTOF10: 10

## 2023-12-13 ASSESSMENT — PAIN DESCRIPTION - DESCRIPTORS: DESCRIPTORS: SHARP

## 2023-12-13 NOTE — ED PROVIDER NOTES
[facility] EMERGENCY DEPT  eMERGENCY dEPARTMENT eNCOUnter      Pt Name: Antoinette Morgan  MRN: [id number]  [address] [facility] 1980  Date of evaluation: 12/13/2023  Provider: MD Pee Arevalo       Chief Complaint   Patient presents with    Abdominal Pain    Testicle Pain     bilateral         HISTORY OF PRESENT ILLNESS   (Location/Symptom, Timing/Onset,Context/Setting, Quality, Duration, Modifying Factors, Severity)  Note limiting factors. Antoinette Morgan is a 39 y.o. male who presents to the emergency department for lower abdominal pain and bilateral testicular pain. Patient states discomfort began acutely around 10 PM last night while he was sitting down. Denies any flank pain or UTI symptoms. No nausea vomiting or diarrhea. No penile discharge or testicular swelling. No history of STD. Has dealt with intermittent similar pain for years and has seen urology in the past who was going to refer him to pain management as has had CT and US before that are negative and no obvious structural abnormality. Gets brief relief when on antibx. No history of kidney stones. HPI    NursingNotes were reviewed. REVIEW OF SYSTEMS    (2-9 systems for level 4, 10 or more for level 5)     Review of Systems   Constitutional:  Negative for chills and fever. HENT:  Negative for rhinorrhea and sore throat. Respiratory:  Negative for cough and shortness of breath. Cardiovascular:  Negative for chest pain and leg swelling. Gastrointestinal:  Positive for abdominal pain. Negative for diarrhea, nausea and vomiting. Genitourinary:  Positive for testicular pain. Negative for dysuria, flank pain, frequency and hematuria. Musculoskeletal:  Negative for back pain and neck pain. Neurological:  Negative for dizziness and headaches. All other systems reviewed and are negative.            PAST MEDICALHISTORY     Past Medical History:   Diagnosis Date    ADHD     Bipolar 1 disorder ([address])          SURGICAL HISTORY     History